# Patient Record
Sex: FEMALE | Race: BLACK OR AFRICAN AMERICAN | NOT HISPANIC OR LATINO | Employment: UNEMPLOYED | URBAN - METROPOLITAN AREA
[De-identification: names, ages, dates, MRNs, and addresses within clinical notes are randomized per-mention and may not be internally consistent; named-entity substitution may affect disease eponyms.]

---

## 2017-07-07 ENCOUNTER — HOSPITAL ENCOUNTER (INPATIENT)
Facility: HOSPITAL | Age: 52
LOS: 7 days | Discharge: HOME | DRG: 337 | End: 2017-07-14
Attending: EMERGENCY MEDICINE | Admitting: SURGERY
Payer: MEDICAID

## 2017-07-07 DIAGNOSIS — R10.9 ABDOMINAL PAIN, UNSPECIFIED LOCATION: ICD-10-CM

## 2017-07-07 DIAGNOSIS — K56.60 INTESTINAL OBSTRUCTION, UNSPECIFIED TYPE: Primary | ICD-10-CM

## 2017-07-07 DIAGNOSIS — R06.02 SHORTNESS OF BREATH: ICD-10-CM

## 2017-07-07 DIAGNOSIS — R10.9 ABDOMINAL PAIN: ICD-10-CM

## 2017-07-07 DIAGNOSIS — K56.609 SMALL BOWEL OBSTRUCTION: ICD-10-CM

## 2017-07-07 LAB
ALBUMIN SERPL BCP-MCNC: 3.7 G/DL
ALP SERPL-CCNC: 100 U/L
ALT SERPL W/O P-5'-P-CCNC: 8 U/L
ANION GAP SERPL CALC-SCNC: 11 MMOL/L
AST SERPL-CCNC: 15 U/L
BASOPHILS # BLD AUTO: 0.01 K/UL
BASOPHILS NFR BLD: 0.1 %
BILIRUB SERPL-MCNC: 0.7 MG/DL
BILIRUB UR QL STRIP: NEGATIVE
BUN SERPL-MCNC: 15 MG/DL
CALCIUM SERPL-MCNC: 9.9 MG/DL
CHLORIDE SERPL-SCNC: 103 MMOL/L
CLARITY UR REFRACT.AUTO: ABNORMAL
CO2 SERPL-SCNC: 27 MMOL/L
COLOR UR AUTO: YELLOW
CREAT SERPL-MCNC: 0.9 MG/DL (ref 0.5–1.4)
CREAT SERPL-MCNC: 1.1 MG/DL
DIFFERENTIAL METHOD: ABNORMAL
EOSINOPHIL # BLD AUTO: 0.1 K/UL
EOSINOPHIL NFR BLD: 1 %
ERYTHROCYTE [DISTWIDTH] IN BLOOD BY AUTOMATED COUNT: 13.1 %
EST. GFR  (AFRICAN AMERICAN): >60 ML/MIN/1.73 M^2
EST. GFR  (NON AFRICAN AMERICAN): 57.1 ML/MIN/1.73 M^2
ESTIMATED AVG GLUCOSE: 100 MG/DL
GLUCOSE SERPL-MCNC: 135 MG/DL
GLUCOSE UR QL STRIP: NEGATIVE
HBA1C MFR BLD HPLC: 5.1 %
HCT VFR BLD AUTO: 45.2 %
HGB BLD-MCNC: 16.3 G/DL
HGB UR QL STRIP: NEGATIVE
KETONES UR QL STRIP: NEGATIVE
LEUKOCYTE ESTERASE UR QL STRIP: NEGATIVE
LIPASE SERPL-CCNC: 23 U/L
LYMPHOCYTES # BLD AUTO: 1.7 K/UL
LYMPHOCYTES NFR BLD: 18.4 %
MCH RBC QN AUTO: 31.2 PG
MCHC RBC AUTO-ENTMCNC: 36.1 %
MCV RBC AUTO: 87 FL
MICROSCOPIC COMMENT: NORMAL
MONOCYTES # BLD AUTO: 0.4 K/UL
MONOCYTES NFR BLD: 4.1 %
NEUTROPHILS # BLD AUTO: 7.2 K/UL
NEUTROPHILS NFR BLD: 76.2 %
NITRITE UR QL STRIP: NEGATIVE
PH UR STRIP: >8 [PH] (ref 5–8)
PLATELET # BLD AUTO: 324 K/UL
PMV BLD AUTO: 9.4 FL
POTASSIUM SERPL-SCNC: 4.2 MMOL/L
PROT SERPL-MCNC: 7.7 G/DL
PROT UR QL STRIP: NEGATIVE
RBC # BLD AUTO: 5.22 M/UL
SAMPLE: NORMAL
SODIUM SERPL-SCNC: 141 MMOL/L
SP GR UR STRIP: >=1.03 (ref 1–1.03)
SQUAMOUS #/AREA URNS AUTO: 4 /HPF
URN SPEC COLLECT METH UR: ABNORMAL
UROBILINOGEN UR STRIP-ACNC: NEGATIVE EU/DL
WBC # BLD AUTO: 9.45 K/UL

## 2017-07-07 PROCEDURE — 96376 TX/PRO/DX INJ SAME DRUG ADON: CPT

## 2017-07-07 PROCEDURE — 20600001 HC STEP DOWN PRIVATE ROOM

## 2017-07-07 PROCEDURE — 85025 COMPLETE CBC W/AUTO DIFF WBC: CPT

## 2017-07-07 PROCEDURE — 63600175 PHARM REV CODE 636 W HCPCS: Performed by: STUDENT IN AN ORGANIZED HEALTH CARE EDUCATION/TRAINING PROGRAM

## 2017-07-07 PROCEDURE — 82803 BLOOD GASES ANY COMBINATION: CPT

## 2017-07-07 PROCEDURE — 25500020 PHARM REV CODE 255: Performed by: EMERGENCY MEDICINE

## 2017-07-07 PROCEDURE — 96375 TX/PRO/DX INJ NEW DRUG ADDON: CPT

## 2017-07-07 PROCEDURE — 81003 URINALYSIS AUTO W/O SCOPE: CPT

## 2017-07-07 PROCEDURE — 25000003 PHARM REV CODE 250: Performed by: SURGERY

## 2017-07-07 PROCEDURE — 99900035 HC TECH TIME PER 15 MIN (STAT)

## 2017-07-07 PROCEDURE — 63600175 PHARM REV CODE 636 W HCPCS: Performed by: SURGERY

## 2017-07-07 PROCEDURE — 63600175 PHARM REV CODE 636 W HCPCS: Performed by: GENERAL PRACTICE

## 2017-07-07 PROCEDURE — 96374 THER/PROPH/DIAG INJ IV PUSH: CPT

## 2017-07-07 PROCEDURE — 83690 ASSAY OF LIPASE: CPT

## 2017-07-07 PROCEDURE — 99285 EMERGENCY DEPT VISIT HI MDM: CPT | Mod: ,,, | Performed by: PHYSICIAN ASSISTANT

## 2017-07-07 PROCEDURE — 25000003 PHARM REV CODE 250: Performed by: GENERAL PRACTICE

## 2017-07-07 PROCEDURE — 25000003 PHARM REV CODE 250: Performed by: PHYSICIAN ASSISTANT

## 2017-07-07 PROCEDURE — 25000003 PHARM REV CODE 250: Performed by: STUDENT IN AN ORGANIZED HEALTH CARE EDUCATION/TRAINING PROGRAM

## 2017-07-07 PROCEDURE — 96361 HYDRATE IV INFUSION ADD-ON: CPT

## 2017-07-07 PROCEDURE — 99223 1ST HOSP IP/OBS HIGH 75: CPT | Mod: ,,, | Performed by: SURGERY

## 2017-07-07 PROCEDURE — 63600175 PHARM REV CODE 636 W HCPCS: Performed by: PHYSICIAN ASSISTANT

## 2017-07-07 PROCEDURE — 80053 COMPREHEN METABOLIC PANEL: CPT

## 2017-07-07 PROCEDURE — 83036 HEMOGLOBIN GLYCOSYLATED A1C: CPT

## 2017-07-07 PROCEDURE — 82565 ASSAY OF CREATININE: CPT

## 2017-07-07 PROCEDURE — 96372 THER/PROPH/DIAG INJ SC/IM: CPT

## 2017-07-07 PROCEDURE — 81001 URINALYSIS AUTO W/SCOPE: CPT

## 2017-07-07 PROCEDURE — 99285 EMERGENCY DEPT VISIT HI MDM: CPT | Mod: 25

## 2017-07-07 RX ORDER — SODIUM CHLORIDE 0.9 % (FLUSH) 0.9 %
3 SYRINGE (ML) INJECTION EVERY 8 HOURS
Status: DISCONTINUED | OUTPATIENT
Start: 2017-07-07 | End: 2017-07-14 | Stop reason: HOSPADM

## 2017-07-07 RX ORDER — HYDRALAZINE HYDROCHLORIDE 20 MG/ML
10 INJECTION INTRAMUSCULAR; INTRAVENOUS EVERY 6 HOURS PRN
Status: DISCONTINUED | OUTPATIENT
Start: 2017-07-07 | End: 2017-07-07

## 2017-07-07 RX ORDER — LABETALOL HYDROCHLORIDE 5 MG/ML
10 INJECTION, SOLUTION INTRAVENOUS
Status: COMPLETED | OUTPATIENT
Start: 2017-07-07 | End: 2017-07-07

## 2017-07-07 RX ORDER — AMLODIPINE BESYLATE 10 MG/1
10 TABLET ORAL DAILY
Status: DISCONTINUED | OUTPATIENT
Start: 2017-07-07 | End: 2017-07-11

## 2017-07-07 RX ORDER — ONDANSETRON 2 MG/ML
4 INJECTION INTRAMUSCULAR; INTRAVENOUS
Status: COMPLETED | OUTPATIENT
Start: 2017-07-07 | End: 2017-07-07

## 2017-07-07 RX ORDER — OXYCODONE AND ACETAMINOPHEN 5; 325 MG/1; MG/1
2 TABLET ORAL
Status: COMPLETED | OUTPATIENT
Start: 2017-07-07 | End: 2017-07-07

## 2017-07-07 RX ORDER — HYDRALAZINE HYDROCHLORIDE 20 MG/ML
10 INJECTION INTRAMUSCULAR; INTRAVENOUS ONCE
Status: COMPLETED | OUTPATIENT
Start: 2017-07-07 | End: 2017-07-07

## 2017-07-07 RX ORDER — HYDRALAZINE HYDROCHLORIDE 20 MG/ML
20 INJECTION INTRAMUSCULAR; INTRAVENOUS EVERY 6 HOURS PRN
Status: DISCONTINUED | OUTPATIENT
Start: 2017-07-07 | End: 2017-07-14 | Stop reason: HOSPADM

## 2017-07-07 RX ORDER — AMLODIPINE BESYLATE 10 MG/1
10 TABLET ORAL DAILY
COMMUNITY
End: 2018-09-11

## 2017-07-07 RX ORDER — IBUPROFEN 200 MG
1 TABLET ORAL DAILY
Status: DISCONTINUED | OUTPATIENT
Start: 2017-07-07 | End: 2017-07-14 | Stop reason: HOSPADM

## 2017-07-07 RX ORDER — LISINOPRIL 10 MG/1
10 TABLET ORAL DAILY
Status: DISCONTINUED | OUTPATIENT
Start: 2017-07-07 | End: 2017-07-07

## 2017-07-07 RX ORDER — AMLODIPINE BESYLATE 10 MG/1
10 TABLET ORAL DAILY
Status: DISCONTINUED | OUTPATIENT
Start: 2017-07-07 | End: 2017-07-07

## 2017-07-07 RX ORDER — HYDRALAZINE HYDROCHLORIDE 20 MG/ML
10 INJECTION INTRAMUSCULAR; INTRAVENOUS ONCE
Status: DISCONTINUED | OUTPATIENT
Start: 2017-07-07 | End: 2017-07-08

## 2017-07-07 RX ORDER — ONDANSETRON 2 MG/ML
8 INJECTION INTRAMUSCULAR; INTRAVENOUS EVERY 8 HOURS PRN
Status: DISCONTINUED | OUTPATIENT
Start: 2017-07-07 | End: 2017-07-14 | Stop reason: HOSPADM

## 2017-07-07 RX ORDER — LISINOPRIL 5 MG/1
10 TABLET ORAL DAILY
Status: DISCONTINUED | OUTPATIENT
Start: 2017-07-07 | End: 2017-07-14 | Stop reason: HOSPADM

## 2017-07-07 RX ORDER — SODIUM CHLORIDE 9 MG/ML
INJECTION, SOLUTION INTRAVENOUS CONTINUOUS
Status: DISCONTINUED | OUTPATIENT
Start: 2017-07-07 | End: 2017-07-09

## 2017-07-07 RX ORDER — MORPHINE SULFATE 2 MG/ML
4 INJECTION, SOLUTION INTRAMUSCULAR; INTRAVENOUS
Status: DISCONTINUED | OUTPATIENT
Start: 2017-07-07 | End: 2017-07-07

## 2017-07-07 RX ORDER — MORPHINE SULFATE 2 MG/ML
2 INJECTION, SOLUTION INTRAMUSCULAR; INTRAVENOUS
Status: DISCONTINUED | OUTPATIENT
Start: 2017-07-07 | End: 2017-07-14 | Stop reason: HOSPADM

## 2017-07-07 RX ORDER — DICYCLOMINE HYDROCHLORIDE 10 MG/ML
20 INJECTION INTRAMUSCULAR
Status: COMPLETED | OUTPATIENT
Start: 2017-07-07 | End: 2017-07-07

## 2017-07-07 RX ORDER — HYDRALAZINE HYDROCHLORIDE 20 MG/ML
10 INJECTION INTRAMUSCULAR; INTRAVENOUS
Status: COMPLETED | OUTPATIENT
Start: 2017-07-07 | End: 2017-07-07

## 2017-07-07 RX ORDER — LABETALOL HYDROCHLORIDE 5 MG/ML
10 INJECTION, SOLUTION INTRAVENOUS ONCE
Status: COMPLETED | OUTPATIENT
Start: 2017-07-07 | End: 2017-07-07

## 2017-07-07 RX ORDER — BENAZEPRIL HYDROCHLORIDE 20 MG/1
20 TABLET ORAL DAILY
Status: DISCONTINUED | OUTPATIENT
Start: 2017-07-07 | End: 2017-07-07

## 2017-07-07 RX ORDER — LABETALOL HYDROCHLORIDE 5 MG/ML
20 INJECTION, SOLUTION INTRAVENOUS EVERY 6 HOURS PRN
Status: DISCONTINUED | OUTPATIENT
Start: 2017-07-07 | End: 2017-07-10

## 2017-07-07 RX ORDER — LABETALOL HYDROCHLORIDE 5 MG/ML
10 INJECTION, SOLUTION INTRAVENOUS ONCE
Status: DISCONTINUED | OUTPATIENT
Start: 2017-07-07 | End: 2017-07-08

## 2017-07-07 RX ADMIN — Medication 3 ML: at 06:07

## 2017-07-07 RX ADMIN — SODIUM CHLORIDE 1000 ML: 0.9 INJECTION, SOLUTION INTRAVENOUS at 04:07

## 2017-07-07 RX ADMIN — HYDRALAZINE HYDROCHLORIDE 10 MG: 20 INJECTION INTRAMUSCULAR; INTRAVENOUS at 04:07

## 2017-07-07 RX ADMIN — OXYCODONE HYDROCHLORIDE AND ACETAMINOPHEN 2 TABLET: 5; 325 TABLET ORAL at 05:07

## 2017-07-07 RX ADMIN — ONDANSETRON 4 MG: 2 INJECTION INTRAMUSCULAR; INTRAVENOUS at 04:07

## 2017-07-07 RX ADMIN — LABETALOL HYDROCHLORIDE 10 MG: 5 INJECTION, SOLUTION INTRAVENOUS at 04:07

## 2017-07-07 RX ADMIN — HYDRALAZINE HYDROCHLORIDE 20 MG: 20 INJECTION INTRAMUSCULAR; INTRAVENOUS at 06:07

## 2017-07-07 RX ADMIN — ONDANSETRON 8 MG: 2 INJECTION INTRAMUSCULAR; INTRAVENOUS at 02:07

## 2017-07-07 RX ADMIN — MORPHINE SULFATE 2 MG: 2 INJECTION, SOLUTION INTRAMUSCULAR; INTRAVENOUS at 02:07

## 2017-07-07 RX ADMIN — HYDRALAZINE HYDROCHLORIDE 10 MG: 20 INJECTION INTRAMUSCULAR; INTRAVENOUS at 09:07

## 2017-07-07 RX ADMIN — NICOTINE 1 PATCH: 14 PATCH, EXTENDED RELEASE TRANSDERMAL at 07:07

## 2017-07-07 RX ADMIN — Medication 3 ML: at 09:07

## 2017-07-07 RX ADMIN — IOHEXOL 75 ML: 350 INJECTION, SOLUTION INTRAVENOUS at 04:07

## 2017-07-07 RX ADMIN — LABETALOL HYDROCHLORIDE 20 MG: 5 INJECTION, SOLUTION INTRAVENOUS at 08:07

## 2017-07-07 RX ADMIN — HYDRALAZINE HYDROCHLORIDE 10 MG: 20 INJECTION INTRAMUSCULAR; INTRAVENOUS at 07:07

## 2017-07-07 RX ADMIN — MORPHINE SULFATE 2 MG: 2 INJECTION, SOLUTION INTRAMUSCULAR; INTRAVENOUS at 06:07

## 2017-07-07 RX ADMIN — HYDRALAZINE HYDROCHLORIDE 10 MG: 20 INJECTION INTRAMUSCULAR; INTRAVENOUS at 05:07

## 2017-07-07 RX ADMIN — AMLODIPINE BESYLATE 10 MG: 10 TABLET ORAL at 09:07

## 2017-07-07 RX ADMIN — SODIUM CHLORIDE: 0.9 INJECTION, SOLUTION INTRAVENOUS at 07:07

## 2017-07-07 RX ADMIN — LISINOPRIL 10 MG: 10 TABLET ORAL at 09:07

## 2017-07-07 RX ADMIN — DICYCLOMINE HYDROCHLORIDE 20 MG: 20 INJECTION, SOLUTION INTRAMUSCULAR at 04:07

## 2017-07-07 NOTE — ED TRIAGE NOTES
Pt brought to ED via  EMS. PT c/o  Mid epigastric pain and nausea and vomiting for the past day. Pt states pain woke her up this evening. Pt describes as burning pain and rates as 10 out of 10.

## 2017-07-07 NOTE — ED PROVIDER NOTES
Encounter Date: 7/7/2017       History     Chief Complaint   Patient presents with    Abdominal Pain     x1 day. +nausea/vomiting x1 hour     52-year-old female presents to the ER for evaluation of abdominal pain.  Patient reports acute onset, 2 hours ago of stabbing abdominal pain.  Abdominal pain is diffuse and nonfocal.  She denies any nausea or vomiting fever or chills.  She denies any chest pain or shortness of breath.  Patient reports last time she had this pain she was diagnosed with uterine cancer.  She denies any changes in her bowel or bladder.          Review of patient's allergies indicates:   Allergen Reactions    Penicillins Hives     Past Medical History:   Diagnosis Date    Cancer     Diabetes mellitus     High cholesterol     Hypertension     Stroke with cerebral ischemia     Stroke, thrombotic      Past Surgical History:   Procedure Laterality Date    ABDOMINAL SURGERY       Family History   Problem Relation Age of Onset    Hypertension Father     Diabetes Maternal Aunt      Social History   Substance Use Topics    Smoking status: Current Every Day Smoker     Packs/day: 0.50     Types: Cigarettes    Smokeless tobacco: Not on file    Alcohol use Yes     Review of Systems   Constitutional: Negative for fever.   HENT: Negative for sore throat.    Respiratory: Negative for shortness of breath.    Cardiovascular: Negative for chest pain.   Gastrointestinal: Positive for abdominal pain. Negative for nausea and vomiting.   Genitourinary: Negative for dysuria.   Musculoskeletal: Negative for back pain.   Skin: Negative for rash.   Neurological: Negative for weakness.   Hematological: Does not bruise/bleed easily.       Physical Exam     Initial Vitals [07/07/17 0348]   BP Pulse Resp Temp SpO2   (!) 155/89 88 16 97.8 °F (36.6 °C) 97 %      MAP       111         Physical Exam    Constitutional: Vital signs are normal. She appears well-developed and well-nourished.   HENT:   Head: Normocephalic  and atraumatic.   Eyes: Conjunctivae are normal.   Cardiovascular: Normal rate and regular rhythm.   Pulmonary/Chest: She has no rhonchi. She exhibits no tenderness.   Abdominal: Soft. Normal appearance, normal aorta and bowel sounds are normal. She exhibits no distension and no mass. There is tenderness. There is no guarding.   Generalized abdominal pain on exam  Good bowel sounds throughout   Musculoskeletal: Normal range of motion.   Neurological: She is alert and oriented to person, place, and time.   Skin: Skin is warm and intact.   Psychiatric: She has a normal mood and affect. Her speech is normal and behavior is normal. Cognition and memory are normal.         ED Course   Procedures  Labs Reviewed   COMPREHENSIVE METABOLIC PANEL - Abnormal; Notable for the following:        Result Value    Glucose 135 (*)     ALT 8 (*)     eGFR if non  57.1 (*)     All other components within normal limits   URINALYSIS, REFLEX TO URINE CULTURE - Abnormal; Notable for the following:     Appearance, UA Cloudy (*)     pH, UA >8.0 (*)     Specific Gravity, UA >=1.030 (*)     All other components within normal limits   CBC W/ AUTO DIFFERENTIAL - Abnormal; Notable for the following:     Hemoglobin 16.3 (*)     MCH 31.2 (*)     MCHC 36.1 (*)     Gran% 76.2 (*)     All other components within normal limits   LIPASE   URINALYSIS MICROSCOPIC   HEMOGLOBIN A1C   ISTAT CREATININE             Medical Decision Making:   ED Management:  52-year-old female with abdominal pain.  Her pain was improved with Bentyl.   Laboratory analysis reveals no acute findings  Disposition pending CT scan  CT scan reveals multiple dilated loops of bowel with a transition point concerning for SBO  Patient's blood pressure has been difficult to manage.  She has chronic hypertension and has been noncompliant on both of her daily medications  Patient's last bowel movement was yesterday.  She has not had any emesis here in the ER.  I have spoken  with general surgery.   Time 0615 - disposition pending general surgery consultation  Patient was admitted to general surgery                   ED Course     Clinical Impression:   The primary encounter diagnosis was Intestinal obstruction, unspecified type. A diagnosis of Abdominal pain, unspecified location was also pertinent to this visit.    Disposition:   Disposition: Admitted  Condition: Stable                        Angel Hensley PA-C  07/07/17 0635

## 2017-07-07 NOTE — PLAN OF CARE
Problem: Patient Care Overview  Goal: Plan of Care Review  Outcome: Ongoing (interventions implemented as appropriate)  Plan of care reviewed with patient. Patient verbalized understanding.  Patient is AAO and VSS. Pain managed with PRN pain meds.  Nausea managed with PRN meds. NPO.  NG tube in right meraz to LIWS. Voids on own with good output. Ambulates independently in room. Free of falls and injury. Will continue to monitor. Brenda Leigh RN

## 2017-07-07 NOTE — PROGRESS NOTES
Paged by nurse for patient's abdominal pain. Went to examine patient. She is not in acute distress. Denies any nausea. Pain is in LLQ and RUQ.    Vitals:    07/07/17 1357   BP: (!) 145/81   Pulse:    Resp:    Temp: 99.7 °F (37.6 °C)     Gen: NAD, resting in bed  CV: RRR  Pulm: CTAB  Abd: soft, TTP in the LLQ and RUQ, no rebound or guarding, no masses, no peritoneal signs  Extremities: no edema; ROM normal    Plan:  Continue conservation mgt; continue with serial abdominal exams  Ok for prn meds    Edmar Miller MD  PGY-3  [phone] (pager)

## 2017-07-07 NOTE — PROGRESS NOTES
[facility]  General Surgery  Progress Note    Subjective:     Interval History: Patient admitted this morning for SBO. Seen and examined. States abdominal pain has improved and overall feels better. No nausea. Still feels distended. Last BM was yesterday.    Post-Op Info:  * No surgery found *          Medications:  Continuous Infusions:   sodium chloride 0.9% 125 mL/hr at 07/07/17 0740     Scheduled Meds:   amlodipine  10 mg Oral Daily    benazepril  20 mg Oral Daily    nicotine  1 patch Transdermal Daily    sodium chloride 0.9%  3 mL Intravenous Q8H     PRN Meds:hydrALAZINE, ondansetron, promethazine (PHENERGAN) IVPB     Objective:     Vital Signs (Most Recent):  Temp: 99.5 °F (37.5 °C) (07/07/17 0712)  Pulse: 61 (07/07/17 0738)  Resp: 18 (07/07/17 0738)  BP: (!) 212/95 (07/07/17 0738)  SpO2: 100 % (07/07/17 0738) Vital Signs (24h Range):  Temp:  [97.8 °F (36.6 °C)-99.5 °F (37.5 °C)] 99.5 °F (37.5 °C)  Pulse:  [61-88] 61  Resp:  [16-18] 18  SpO2:  [97 %-100 %] 100 %  BP: (155-229)/() 212/95     No intake or output data in the 24 hours ending 07/07/17 0755    Physical Exam   Constitutional: She is oriented to person, place, and time. She appears well-developed and well-nourished.   HENT:   Head: Normocephalic and atraumatic.   Eyes: EOM are normal.   Neck: Normal range of motion. Neck supple.   Cardiovascular: Normal rate, regular rhythm and normal heart sounds.    Hypertensive with systolics in the 190s   Pulmonary/Chest: Effort normal and breath sounds normal.   Abdominal: Soft. Bowel sounds are normal. She exhibits distension. There is no tenderness.   Neurological: She is alert and oriented to person, place, and time.   Skin: Skin is warm and dry.   Psychiatric: She has a normal mood and affect. Her behavior is normal.       Significant Labs:  BMP:   Recent Labs  Lab 07/07/17  0407   *      K 4.2      CO2 27   BUN 15   CREATININE 1.1   CALCIUM 9.9     CBC:    Recent Labs  Lab 07/07/17  0441   WBC 9.45   RBC 5.22   HGB 16.3*   HCT 45.2      MCV 87   MCH 31.2*   MCHC 36.1*       Significant Diagnostics:  CT: I have reviewed all pertinent results/findings within the past 24 hours.    1.  Multiple dilated loops of small bowel within the left abdomen with transition point in the mid abdomen concerning for small bowel obstruction.    2.  Status post hysterectomy.  Left Bartholin's gland cyst.    3.  Findings suggestive of hepatic steatosis with focal region of fatty infiltration along the ligamentum teres.    4.  Additional findings as above.    Assessment/Plan:     Active Diagnoses:    Diagnosis Date Noted POA    PRINCIPAL PROBLEM:  Small bowel obstruction [K56.69] 07/07/2017 Yes    Intestinal obstruction [K56.60] 07/07/2017 Yes      Problems Resolved During this Admission:    Diagnosis Date Noted Date Resolved POA     -Continue with conservative management; keep patient NPO with IVF  -GI and DVT prophylaxis  -Nicotine patch for history of smoking (1p q2 days)  -PRN IV pain meds and anti-emetics  -PRN IV anti-hypertensives  -Discussed with Dr. Goldberg Jeffanne Millien, MD  General Surgery  [facility]

## 2017-07-07 NOTE — H&P
[facility]  General Surgery  Consult Note    Inpatient consult to General surgery  Consult performed by: SCHOEN, JONATHAN  Consult ordered by: KAYLEEN SALCEDO  Reason for consult: SBO  Assessment/Recommendations: Admit to general surgery  NPO c IVF  NG insertion completed in ED, pending KUB to confirm  Discussed with Dr. Goldberg        Subjective:     Chief Complaint/Reason for Admission: Small Bowel Obstruction    History of Present Illness: The patient Brisa Chavez is a 52 y.o. female with history of HTN, HLD, endometriosis with ROSEY last year at an outside facility presents with approx 18 hour history of abdominal pain and non-bilious, non-bloody nausea and vomiting.  She reports the abdominal pain is a diffuse ache in her midabdomen that is non-radiating.  She states her last bowel movement was yesterday and she also has not passed flatus since that time.  She denies prior similar episodes.      No current facility-administered medications on file prior to encounter.      Current Outpatient Prescriptions on File Prior to Encounter   Medication Sig    aspirin (ECOTRIN) 325 MG EC tablet Take 1 tablet (325 mg total) by mouth once daily.    atorvastatin (LIPITOR) 10 MG tablet Take 10 mg by mouth once daily.    benazepril-hydrochlorthiazide (LOTENSIN HCT) 20-25 mg Tab Take 1 tablet by mouth once daily.    lisinopril 10 MG tablet Take 10 mg by mouth once daily.    METFORMIN HCL (METFORMIN ORAL) Take by mouth.    oxycodone-acetaminophen (PERCOCET) 5-325 mg per tablet Take 1 tablet by mouth every 6 (six) hours as needed for Pain. No alcohol, no driving, no operating machinery, no working, no swimming, no additional Tylenol (acetaminophen) while taking this medication.       Review of patient's allergies indicates:   Allergen Reactions    Penicillins Hives       Past Medical History:   Diagnosis Date    Cancer     Diabetes mellitus     High cholesterol     Hypertension     Stroke with  cerebral ischemia     Stroke, thrombotic      Past Surgical History:   Procedure Laterality Date    ABDOMINAL SURGERY       Family History     Problem Relation (Age of Onset)    Diabetes Maternal Aunt    Hypertension Father        Social History Main Topics    Smoking status: Current Every Day Smoker     Packs/day: 0.50     Types: Cigarettes    Smokeless tobacco: Not on file    Alcohol use Yes    Drug use: No    Sexual activity: Not on file     Review of Systems   Constitutional: Negative for chills and fever.   HENT: Negative for congestion and rhinorrhea.    Eyes: Negative for visual disturbance.   Respiratory: Negative for apnea, chest tightness and shortness of breath.    Cardiovascular: Negative for chest pain and palpitations.   Gastrointestinal: Positive for abdominal distention, abdominal pain, nausea and vomiting. Negative for constipation and diarrhea.   Endocrine: Negative for cold intolerance and heat intolerance.   Musculoskeletal: Negative for arthralgias and myalgias.   Skin: Negative for color change, pallor, rash and wound.   Hematological: Negative for adenopathy. Does not bruise/bleed easily.     Objective:     Vital Signs (Most Recent):  Temp: 97.8 °F (36.6 °C) (07/07/17 0348)  Pulse: 88 (07/07/17 0348)  Resp: 16 (07/07/17 0348)  BP: (!) 229/103 (07/07/17 0521)  SpO2: 100 % (07/07/17 0521) Vital Signs (24h Range):  Temp:  [97.8 °F (36.6 °C)] 97.8 °F (36.6 °C)  Pulse:  [88] 88  Resp:  [16] 16  SpO2:  [97 %-100 %] 100 %  BP: (155-229)/() 229/103     Weight: 72.1 kg (159 lb)  Body mass index is 26.46 kg/m².    No intake or output data in the 24 hours ending 07/07/17 0615    Physical Exam   Constitutional: She is oriented to person, place, and time. She appears well-developed and well-nourished.   Cardiovascular: Normal rate.    Pulmonary/Chest: Effort normal. No respiratory distress.   Abdominal: Soft. She exhibits distension. Bowel sounds are increased. There is no tenderness. No  hernia.       Neurological: She is alert and oriented to person, place, and time.   Nursing note and vitals reviewed.      Significant Labs:  Recent Labs      07/07/17   0441   WBC  9.45   HGB  16.3*   HCT  45.2   PLT  324     Recent Labs      07/07/17   0407   NA  141   K  4.2   CL  103   CO2  27   BUN  15   CREATININE  1.1   PROT  7.7   ALBUMIN  3.7   BILITOT  0.7   AST  15   ALKPHOS  100   ALT  8*           Significant Diagnostics:  Impression        1.  Multiple dilated loops of small bowel within the left abdomen with transition point in the mid abdomen concerning for small bowel obstruction.    2.  Status post hysterectomy.  Left Bartholin's gland cyst.    3.  Findings suggestive of hepatic steatosis with focal region of fatty infiltration along the ligamentum teres.    4.  Additional findings as above.    ______________________________________     Electronically signed by resident: OSMANY BRASWELL MD  Date: 07/07/17  Time: 05:37         Assessment/Plan:     There are no hospital problems to display for this patient.      Thank you for your consult.     Jonathan Schoen, MD  General Surgery  [facility]

## 2017-07-07 NOTE — ED NOTES
The patient is resting quietly, eyes closed, arouses easily to stimuli. Airway is open and patent, respirations are spontaneous, normal respiratory effort and rate noted, skin warm and dry, appearance: in no acute distress and resting comfortably. NG tube present in left nostril connected to low intermittent suction. Patient reminded that she is NPO at this time. Patient repositioned for comfort. Pulse ox and blood pressure cuff remain on with alarms set. Lights dimmed per patient request. Call bell within reach. Side rails x 2. Telephone placed within reach of patient, instructed on use.  Will continue to monitor at this time.

## 2017-07-07 NOTE — ED NOTES
General surgery paged to change PO meds to NG tube and to notify about blood pressure 207/88 after hydralazine, stated that they would put something else in shortly, verbalized understanding.

## 2017-07-08 PROBLEM — I10 HYPERTENSION: Status: ACTIVE | Noted: 2017-07-08

## 2017-07-08 PROBLEM — E87.6 HYPOKALEMIA: Status: ACTIVE | Noted: 2017-07-08

## 2017-07-08 LAB
ANION GAP SERPL CALC-SCNC: 10 MMOL/L
BASOPHILS # BLD AUTO: 0.01 K/UL
BASOPHILS NFR BLD: 0.1 %
BUN SERPL-MCNC: 15 MG/DL
CALCIUM SERPL-MCNC: 9 MG/DL
CHLORIDE SERPL-SCNC: 105 MMOL/L
CO2 SERPL-SCNC: 25 MMOL/L
CREAT SERPL-MCNC: 0.9 MG/DL
DIFFERENTIAL METHOD: ABNORMAL
EOSINOPHIL # BLD AUTO: 0 K/UL
EOSINOPHIL NFR BLD: 0.1 %
ERYTHROCYTE [DISTWIDTH] IN BLOOD BY AUTOMATED COUNT: 13.5 %
EST. GFR  (AFRICAN AMERICAN): >60 ML/MIN/1.73 M^2
EST. GFR  (NON AFRICAN AMERICAN): >60 ML/MIN/1.73 M^2
GLUCOSE SERPL-MCNC: 136 MG/DL
HCT VFR BLD AUTO: 40.3 %
HGB BLD-MCNC: 14.1 G/DL
LYMPHOCYTES # BLD AUTO: 1.5 K/UL
LYMPHOCYTES NFR BLD: 17.9 %
MAGNESIUM SERPL-MCNC: 1.9 MG/DL
MCH RBC QN AUTO: 30.7 PG
MCHC RBC AUTO-ENTMCNC: 35 %
MCV RBC AUTO: 88 FL
MONOCYTES # BLD AUTO: 0.4 K/UL
MONOCYTES NFR BLD: 4.3 %
NEUTROPHILS # BLD AUTO: 6.5 K/UL
NEUTROPHILS NFR BLD: 77.4 %
PHOSPHATE SERPL-MCNC: 2.9 MG/DL
PLATELET # BLD AUTO: 275 K/UL
PMV BLD AUTO: 10.2 FL
POTASSIUM SERPL-SCNC: 3.3 MMOL/L
RBC # BLD AUTO: 4.59 M/UL
SODIUM SERPL-SCNC: 140 MMOL/L
WBC # BLD AUTO: 8.38 K/UL

## 2017-07-08 PROCEDURE — 63600175 PHARM REV CODE 636 W HCPCS: Performed by: SURGERY

## 2017-07-08 PROCEDURE — 84100 ASSAY OF PHOSPHORUS: CPT

## 2017-07-08 PROCEDURE — 25000003 PHARM REV CODE 250: Performed by: STUDENT IN AN ORGANIZED HEALTH CARE EDUCATION/TRAINING PROGRAM

## 2017-07-08 PROCEDURE — 25000003 PHARM REV CODE 250: Performed by: GENERAL PRACTICE

## 2017-07-08 PROCEDURE — 36415 COLL VENOUS BLD VENIPUNCTURE: CPT

## 2017-07-08 PROCEDURE — 85025 COMPLETE CBC W/AUTO DIFF WBC: CPT

## 2017-07-08 PROCEDURE — 80048 BASIC METABOLIC PNL TOTAL CA: CPT

## 2017-07-08 PROCEDURE — 63600175 PHARM REV CODE 636 W HCPCS: Performed by: STUDENT IN AN ORGANIZED HEALTH CARE EDUCATION/TRAINING PROGRAM

## 2017-07-08 PROCEDURE — 20600001 HC STEP DOWN PRIVATE ROOM

## 2017-07-08 PROCEDURE — 25000003 PHARM REV CODE 250: Performed by: SURGERY

## 2017-07-08 PROCEDURE — 83735 ASSAY OF MAGNESIUM: CPT

## 2017-07-08 RX ORDER — CLONIDINE 0.1 MG/24H
1 PATCH, EXTENDED RELEASE TRANSDERMAL
Status: DISCONTINUED | OUTPATIENT
Start: 2017-07-08 | End: 2017-07-14 | Stop reason: HOSPADM

## 2017-07-08 RX ORDER — FAMOTIDINE 10 MG/ML
20 INJECTION INTRAVENOUS 2 TIMES DAILY
Status: DISCONTINUED | OUTPATIENT
Start: 2017-07-08 | End: 2017-07-14

## 2017-07-08 RX ORDER — POTASSIUM CHLORIDE 7.45 MG/ML
10 INJECTION INTRAVENOUS
Status: COMPLETED | OUTPATIENT
Start: 2017-07-08 | End: 2017-07-08

## 2017-07-08 RX ORDER — ENOXAPARIN SODIUM 100 MG/ML
40 INJECTION SUBCUTANEOUS EVERY 24 HOURS
Status: DISCONTINUED | OUTPATIENT
Start: 2017-07-08 | End: 2017-07-10 | Stop reason: SDUPTHER

## 2017-07-08 RX ADMIN — FAMOTIDINE 20 MG: 10 INJECTION, SOLUTION INTRAVENOUS at 08:07

## 2017-07-08 RX ADMIN — MORPHINE SULFATE 2 MG: 2 INJECTION, SOLUTION INTRAMUSCULAR; INTRAVENOUS at 04:07

## 2017-07-08 RX ADMIN — POTASSIUM CHLORIDE 10 MEQ: 10 INJECTION, SOLUTION INTRAVENOUS at 03:07

## 2017-07-08 RX ADMIN — POTASSIUM CHLORIDE 10 MEQ: 10 INJECTION, SOLUTION INTRAVENOUS at 02:07

## 2017-07-08 RX ADMIN — ONDANSETRON 8 MG: 2 INJECTION INTRAMUSCULAR; INTRAVENOUS at 04:07

## 2017-07-08 RX ADMIN — LABETALOL HYDROCHLORIDE 20 MG: 5 INJECTION, SOLUTION INTRAVENOUS at 04:07

## 2017-07-08 RX ADMIN — LABETALOL HYDROCHLORIDE 20 MG: 5 INJECTION, SOLUTION INTRAVENOUS at 03:07

## 2017-07-08 RX ADMIN — SODIUM CHLORIDE: 0.9 INJECTION, SOLUTION INTRAVENOUS at 04:07

## 2017-07-08 RX ADMIN — POTASSIUM CHLORIDE 10 MEQ: 10 INJECTION, SOLUTION INTRAVENOUS at 12:07

## 2017-07-08 RX ADMIN — POTASSIUM CHLORIDE 10 MEQ: 10 INJECTION, SOLUTION INTRAVENOUS at 11:07

## 2017-07-08 RX ADMIN — Medication 3 ML: at 10:07

## 2017-07-08 RX ADMIN — MORPHINE SULFATE 2 MG: 2 INJECTION, SOLUTION INTRAMUSCULAR; INTRAVENOUS at 08:07

## 2017-07-08 RX ADMIN — POTASSIUM CHLORIDE 10 MEQ: 10 INJECTION, SOLUTION INTRAVENOUS at 01:07

## 2017-07-08 RX ADMIN — NICOTINE 1 PATCH: 14 PATCH, EXTENDED RELEASE TRANSDERMAL at 08:07

## 2017-07-08 RX ADMIN — HYDRALAZINE HYDROCHLORIDE 20 MG: 20 INJECTION INTRAMUSCULAR; INTRAVENOUS at 12:07

## 2017-07-08 RX ADMIN — AMLODIPINE BESYLATE 10 MG: 10 TABLET ORAL at 08:07

## 2017-07-08 RX ADMIN — POTASSIUM CHLORIDE 10 MEQ: 10 INJECTION, SOLUTION INTRAVENOUS at 10:07

## 2017-07-08 RX ADMIN — HYDRALAZINE HYDROCHLORIDE 20 MG: 20 INJECTION INTRAMUSCULAR; INTRAVENOUS at 11:07

## 2017-07-08 RX ADMIN — MORPHINE SULFATE 2 MG: 2 INJECTION, SOLUTION INTRAMUSCULAR; INTRAVENOUS at 12:07

## 2017-07-08 RX ADMIN — LISINOPRIL 10 MG: 10 TABLET ORAL at 08:07

## 2017-07-08 RX ADMIN — MORPHINE SULFATE 2 MG: 2 INJECTION, SOLUTION INTRAMUSCULAR; INTRAVENOUS at 06:07

## 2017-07-08 RX ADMIN — ENOXAPARIN SODIUM 40 MG: 100 INJECTION SUBCUTANEOUS at 08:07

## 2017-07-08 RX ADMIN — CLONIDINE 1 PATCH: 0.1 PATCH TRANSDERMAL at 02:07

## 2017-07-08 NOTE — PROGRESS NOTES
[facility]  General Surgery  Progress Note    Subjective:     History of Present Illness:  No notes on file    Post-Op Info:  * No surgery found *         Interval History: No acute events overnight. HTN that required multiple doses PRN hydralazine and labetalol, on home BP meds. Some abdominal pain, no nausea. NG tube in place. Did pass flatus last night.    Medications:  Continuous Infusions:   sodium chloride 0.9% 125 mL/hr at 07/08/17 0442     Scheduled Meds:   amlodipine  10 mg Per NG tube Daily    hydrALAZINE  10 mg Intravenous Once    labetalol  10 mg Intravenous Once    lisinopril  10 mg Per NG tube Daily    nicotine  1 patch Transdermal Daily    sodium chloride 0.9%  3 mL Intravenous Q8H     PRN Meds:hydrALAZINE, labetalol, morphine, ondansetron, promethazine (PHENERGAN) IVPB     Review of patient's allergies indicates:   Allergen Reactions    Penicillins Hives     Objective:     Vital Signs (Most Recent):  Temp: 99.6 °F (37.6 °C) (07/08/17 0710)  Pulse: 69 (07/08/17 0710)  Resp: 16 (07/08/17 0710)  BP: (!) 156/68 (07/08/17 0710)  SpO2: 96 % (07/08/17 0710) Vital Signs (24h Range):  Temp:  [99.4 °F (37.4 °C)-100.4 °F (38 °C)] 99.6 °F (37.6 °C)  Pulse:  [65-87] 69  Resp:  [16-20] 16  SpO2:  [94 %-100 %] 96 %  BP: (135-212)/(60-95) 156/68     Weight: 72.1 kg (159 lb)  Body mass index is 26.46 kg/m².    Intake/Output - Last 3 Shifts       07/06 0700 - 07/07 0659 07/07 0700 - 07/08 0659 07/08 0700 - 07/09 0659    P.O.  0     I.V. (mL/kg)  2502.1 (34.7)     Total Intake(mL/kg)  2502.1 (34.7)     Urine (mL/kg/hr)  700 (0.4)     Drains  550 (0.3)     Total Output   1250      Net   +1252.1             Urine Occurrence  1 x           Physical Exam   Constitutional: She is oriented to person, place, and time. She appears well-developed and well-nourished.   Eyes: EOM are normal. Pupils are equal, round, and reactive to light.   Neck: Normal range of motion.   Cardiovascular: Normal rate  and regular rhythm.    Pulmonary/Chest: Effort normal.   Abdominal: Soft. Bowel sounds are normal. She exhibits no distension. There is tenderness. There is no rebound and no guarding.   NG tube in place draining brown fluid,300 cc overnight   Musculoskeletal: Normal range of motion.   Neurological: She is alert and oriented to person, place, and time.   Skin: Skin is warm.   Psychiatric: She has a normal mood and affect.       Significant Labs:  CBC:   Recent Labs  Lab 07/08/17  0431   WBC 8.38   RBC 4.59   HGB 14.1   HCT 40.3      MCV 88   MCH 30.7   MCHC 35.0     CMP:   Recent Labs  Lab 07/07/17  0407 07/08/17  0431   * 136*   CALCIUM 9.9 9.0   ALBUMIN 3.7  --    PROT 7.7  --     140   K 4.2 3.3*   CO2 27 25    105   BUN 15 15   CREATININE 1.1 0.9   ALKPHOS 100  --    ALT 8*  --    AST 15  --    BILITOT 0.7  --        Significant Diagnostics:  none    Assessment/Plan:     Hypokalemia    replace        Hypertension    On home BP meds  PRN labetalol and hydralazine        * Small bowel obstruction    53 yo woman with SBO.    - NPO, IVF  - NG tube  - serial abdominal exams  - if she doesn't open up by tomorrow may take to OR monday            Violetta Miles MD  General Surgery  [facility]

## 2017-07-08 NOTE — PROGRESS NOTES
1600  Pt seen for serial abdominal exam. States she is feeling okay, with mild pain, and improved nausea. Denies worsening symptoms, CP/SOB, Vomiting.    Abd exam: soft, non-tender, mildly distended, no guarding   NGT w/ approx 110cc bilious fluid    0030  Interval: See above.   No change in PE    0245  Interval: Pt seen for report of increasing abdominal pain. Pt sleeping comfortably but arousable. States her pain medication is doing less to manage her intermittent abdominal pain. Still c/o mild nausea. Denies onset of additional symptoms    Vitals:    07/09/17 0100   BP: (!) 147/67   Pulse: 72   Resp:    Temp:      NGT output decreased since 2300: approx 100cc    ABD: soft, mildly distended, no guarding, tender to deep palpation, non peritoneal       - Will cont to monitor     Jose Maria Blackwood MD   [phone]  General Surgery [facility]

## 2017-07-08 NOTE — SUBJECTIVE & OBJECTIVE
Interval History: No acute events overnight. HTN that required multiple doses PRN hydralazine and labetalol, on home BP meds. Some abdominal pain, no nausea. NG tube in place. Did pass flatus last night.    Medications:  Continuous Infusions:   sodium chloride 0.9% 125 mL/hr at 07/08/17 0442     Scheduled Meds:   amlodipine  10 mg Per NG tube Daily    hydrALAZINE  10 mg Intravenous Once    labetalol  10 mg Intravenous Once    lisinopril  10 mg Per NG tube Daily    nicotine  1 patch Transdermal Daily    sodium chloride 0.9%  3 mL Intravenous Q8H     PRN Meds:hydrALAZINE, labetalol, morphine, ondansetron, promethazine (PHENERGAN) IVPB     Review of patient's allergies indicates:   Allergen Reactions    Penicillins Hives     Objective:     Vital Signs (Most Recent):  Temp: 99.6 °F (37.6 °C) (07/08/17 0710)  Pulse: 69 (07/08/17 0710)  Resp: 16 (07/08/17 0710)  BP: (!) 156/68 (07/08/17 0710)  SpO2: 96 % (07/08/17 0710) Vital Signs (24h Range):  Temp:  [99.4 °F (37.4 °C)-100.4 °F (38 °C)] 99.6 °F (37.6 °C)  Pulse:  [65-87] 69  Resp:  [16-20] 16  SpO2:  [94 %-100 %] 96 %  BP: (135-212)/(60-95) 156/68     Weight: 72.1 kg (159 lb)  Body mass index is 26.46 kg/m².    Intake/Output - Last 3 Shifts       07/06 0700 - 07/07 0659 07/07 0700 - 07/08 0659 07/08 0700 - 07/09 0659    P.O.  0     I.V. (mL/kg)  2502.1 (34.7)     Total Intake(mL/kg)  2502.1 (34.7)     Urine (mL/kg/hr)  700 (0.4)     Drains  550 (0.3)     Total Output   1250      Net   +1252.1             Urine Occurrence  1 x           Physical Exam   Constitutional: She is oriented to person, place, and time. She appears well-developed and well-nourished.   Eyes: EOM are normal. Pupils are equal, round, and reactive to light.   Neck: Normal range of motion.   Cardiovascular: Normal rate and regular rhythm.    Pulmonary/Chest: Effort normal.   Abdominal: Soft. Bowel sounds are normal. She exhibits no distension. There is tenderness. There is no rebound and no  guarding.   NG tube in place draining brown fluid,300 cc overnight   Musculoskeletal: Normal range of motion.   Neurological: She is alert and oriented to person, place, and time.   Skin: Skin is warm.   Psychiatric: She has a normal mood and affect.       Significant Labs:  CBC:   Recent Labs  Lab 07/08/17  0431   WBC 8.38   RBC 4.59   HGB 14.1   HCT 40.3      MCV 88   MCH 30.7   MCHC 35.0     CMP:   Recent Labs  Lab 07/07/17  0407 07/08/17  0431   * 136*   CALCIUM 9.9 9.0   ALBUMIN 3.7  --    PROT 7.7  --     140   K 4.2 3.3*   CO2 27 25    105   BUN 15 15   CREATININE 1.1 0.9   ALKPHOS 100  --    ALT 8*  --    AST 15  --    BILITOT 0.7  --        Significant Diagnostics:  none

## 2017-07-08 NOTE — PLAN OF CARE
Problem: Patient Care Overview  Goal: Plan of Care Review  Outcome: Ongoing (interventions implemented as appropriate)  POC reviewed with patient. Pt AAOx4, BP hypertensive - managed with PRN meds - all other VSS, afebrile, NSR on tele. SpO2> 92% on rm air. Pt complained of mild pain, no PRN meds needed. Right nares NG tube to LIWS,  Output monitored. Fluids continued. Pt remains NPO, no complaints of nausea/vomiting. Pt urinating per hat. Up with minimal assistance. Pt remains free from falls and injuries, will continue to monitor.

## 2017-07-08 NOTE — PLAN OF CARE
Problem: Patient Care Overview  Goal: Plan of Care Review  Outcome: Ongoing (interventions implemented as appropriate)  Plan of care reviewed with patient. Patient verbalized understanding.  Patient is AAO.  Elevated BP treated with IV hydralazine and Labetalol  Pain managed with PRN pain meds.  No complaint of nausea or vomiting.  NG tube in right meraz to LIWS. Voids on own with good output. Received IV potassium replacement.  Ambulates with stand by assit. Free of falls and injury. Will continue to monitor. Brenda Leigh RN

## 2017-07-08 NOTE — ASSESSMENT & PLAN NOTE
53 yo woman with SBO.    - NPO, IVF  - NG tube  - serial abdominal exams  - if she doesn't open up by tomorrow may take to OR monday

## 2017-07-08 NOTE — PROGRESS NOTES
Progress Note:  Checked in on Brisa voss per request of day ACS for serial abdominal exams. Patient states that her abdominal pain comes and goes and at the time of my visit she said that she had no abdominal pain. She denied nausea and vomiting at the time of my visit. Abdomen is soft, mildly distended, no tenderness to palpation.    Nancy Jiménez   PGY-1 General Surgery  [facility]

## 2017-07-08 NOTE — PLAN OF CARE
Problem: Patient Care Overview  Goal: Plan of Care Review  Outcome: Ongoing (interventions implemented as appropriate)  POC reviewed with patient. Pt AAOx4, BP hypertensive - managed with PRN meds - all other VSS, afebrile, NSR on tele. SpO2> 92% on rm air. Pt complained of mild pain, no PRN meds needed. Right nares NG tube to LIWS,  Output monitored. Fluids continued. Pt remains NPO, complained of nausea x1 during shift. Pt urinating per hat. Up with minimal assistance. Pt remains free from falls and injuries, will continue to monitor.

## 2017-07-09 ENCOUNTER — ANESTHESIA EVENT (OUTPATIENT)
Dept: SURGERY | Facility: HOSPITAL | Age: 52
DRG: 337 | End: 2017-07-09
Payer: MEDICAID

## 2017-07-09 PROBLEM — E83.39 HYPOPHOSPHATASIA: Status: ACTIVE | Noted: 2017-07-09

## 2017-07-09 LAB
ABO + RH BLD: NORMAL
ANION GAP SERPL CALC-SCNC: 8 MMOL/L
BASOPHILS # BLD AUTO: 0.02 K/UL
BASOPHILS NFR BLD: 0.2 %
BLD GP AB SCN CELLS X3 SERPL QL: NORMAL
BUN SERPL-MCNC: 18 MG/DL
CALCIUM SERPL-MCNC: 8.6 MG/DL
CHLORIDE SERPL-SCNC: 109 MMOL/L
CO2 SERPL-SCNC: 25 MMOL/L
CREAT SERPL-MCNC: 0.9 MG/DL
DIFFERENTIAL METHOD: ABNORMAL
EOSINOPHIL # BLD AUTO: 0.1 K/UL
EOSINOPHIL NFR BLD: 1 %
ERYTHROCYTE [DISTWIDTH] IN BLOOD BY AUTOMATED COUNT: 13.6 %
EST. GFR  (AFRICAN AMERICAN): >60 ML/MIN/1.73 M^2
EST. GFR  (NON AFRICAN AMERICAN): >60 ML/MIN/1.73 M^2
GLUCOSE SERPL-MCNC: 116 MG/DL
HCT VFR BLD AUTO: 37.1 %
HGB BLD-MCNC: 13 G/DL
LYMPHOCYTES # BLD AUTO: 2 K/UL
LYMPHOCYTES NFR BLD: 20.6 %
MAGNESIUM SERPL-MCNC: 1.9 MG/DL
MCH RBC QN AUTO: 30.9 PG
MCHC RBC AUTO-ENTMCNC: 35 %
MCV RBC AUTO: 88 FL
MONOCYTES # BLD AUTO: 0.4 K/UL
MONOCYTES NFR BLD: 4.3 %
NEUTROPHILS # BLD AUTO: 7.1 K/UL
NEUTROPHILS NFR BLD: 73.7 %
PHOSPHATE SERPL-MCNC: 2.4 MG/DL
PLATELET # BLD AUTO: 298 K/UL
PMV BLD AUTO: 9.6 FL
POTASSIUM SERPL-SCNC: 3.7 MMOL/L
RBC # BLD AUTO: 4.21 M/UL
SODIUM SERPL-SCNC: 142 MMOL/L
WBC # BLD AUTO: 9.6 K/UL

## 2017-07-09 PROCEDURE — 25000003 PHARM REV CODE 250: Performed by: STUDENT IN AN ORGANIZED HEALTH CARE EDUCATION/TRAINING PROGRAM

## 2017-07-09 PROCEDURE — 36415 COLL VENOUS BLD VENIPUNCTURE: CPT

## 2017-07-09 PROCEDURE — 83735 ASSAY OF MAGNESIUM: CPT

## 2017-07-09 PROCEDURE — 85025 COMPLETE CBC W/AUTO DIFF WBC: CPT

## 2017-07-09 PROCEDURE — 63600175 PHARM REV CODE 636 W HCPCS: Performed by: STUDENT IN AN ORGANIZED HEALTH CARE EDUCATION/TRAINING PROGRAM

## 2017-07-09 PROCEDURE — 20600001 HC STEP DOWN PRIVATE ROOM

## 2017-07-09 PROCEDURE — 25000003 PHARM REV CODE 250: Performed by: SURGERY

## 2017-07-09 PROCEDURE — 84100 ASSAY OF PHOSPHORUS: CPT

## 2017-07-09 PROCEDURE — 25000003 PHARM REV CODE 250: Performed by: GENERAL PRACTICE

## 2017-07-09 PROCEDURE — 86900 BLOOD TYPING SEROLOGIC ABO: CPT

## 2017-07-09 PROCEDURE — 80048 BASIC METABOLIC PNL TOTAL CA: CPT

## 2017-07-09 PROCEDURE — 86901 BLOOD TYPING SEROLOGIC RH(D): CPT

## 2017-07-09 RX ORDER — DEXTROSE MONOHYDRATE, SODIUM CHLORIDE, AND POTASSIUM CHLORIDE 50; 1.49; 4.5 G/1000ML; G/1000ML; G/1000ML
INJECTION, SOLUTION INTRAVENOUS CONTINUOUS
Status: DISCONTINUED | OUTPATIENT
Start: 2017-07-10 | End: 2017-07-14

## 2017-07-09 RX ORDER — POTASSIUM CHLORIDE 7.45 MG/ML
10 INJECTION INTRAVENOUS
Status: COMPLETED | OUTPATIENT
Start: 2017-07-09 | End: 2017-07-09

## 2017-07-09 RX ADMIN — LABETALOL HYDROCHLORIDE 20 MG: 5 INJECTION, SOLUTION INTRAVENOUS at 07:07

## 2017-07-09 RX ADMIN — MORPHINE SULFATE 2 MG: 2 INJECTION, SOLUTION INTRAMUSCULAR; INTRAVENOUS at 06:07

## 2017-07-09 RX ADMIN — Medication 3 ML: at 06:07

## 2017-07-09 RX ADMIN — FAMOTIDINE 20 MG: 10 INJECTION, SOLUTION INTRAVENOUS at 09:07

## 2017-07-09 RX ADMIN — SODIUM PHOSPHATE, MONOBASIC, MONOHYDRATE 30 MMOL: 276; 142 INJECTION, SOLUTION INTRAVENOUS at 09:07

## 2017-07-09 RX ADMIN — MORPHINE SULFATE 2 MG: 2 INJECTION, SOLUTION INTRAMUSCULAR; INTRAVENOUS at 10:07

## 2017-07-09 RX ADMIN — ENOXAPARIN SODIUM 40 MG: 100 INJECTION SUBCUTANEOUS at 05:07

## 2017-07-09 RX ADMIN — MORPHINE SULFATE 2 MG: 2 INJECTION, SOLUTION INTRAMUSCULAR; INTRAVENOUS at 03:07

## 2017-07-09 RX ADMIN — NICOTINE 1 PATCH: 14 PATCH, EXTENDED RELEASE TRANSDERMAL at 09:07

## 2017-07-09 RX ADMIN — POTASSIUM CHLORIDE 10 MEQ: 10 INJECTION, SOLUTION INTRAVENOUS at 10:07

## 2017-07-09 RX ADMIN — SODIUM CHLORIDE: 0.9 INJECTION, SOLUTION INTRAVENOUS at 03:07

## 2017-07-09 RX ADMIN — LISINOPRIL 10 MG: 10 TABLET ORAL at 09:07

## 2017-07-09 RX ADMIN — MORPHINE SULFATE 2 MG: 2 INJECTION, SOLUTION INTRAMUSCULAR; INTRAVENOUS at 09:07

## 2017-07-09 RX ADMIN — Medication 3 ML: at 10:07

## 2017-07-09 RX ADMIN — MORPHINE SULFATE 2 MG: 2 INJECTION, SOLUTION INTRAMUSCULAR; INTRAVENOUS at 12:07

## 2017-07-09 RX ADMIN — HYDRALAZINE HYDROCHLORIDE 20 MG: 20 INJECTION INTRAMUSCULAR; INTRAVENOUS at 12:07

## 2017-07-09 RX ADMIN — HYDRALAZINE HYDROCHLORIDE 20 MG: 20 INJECTION INTRAMUSCULAR; INTRAVENOUS at 05:07

## 2017-07-09 RX ADMIN — AMLODIPINE BESYLATE 10 MG: 10 TABLET ORAL at 09:07

## 2017-07-09 RX ADMIN — POTASSIUM CHLORIDE 10 MEQ: 10 INJECTION, SOLUTION INTRAVENOUS at 12:07

## 2017-07-09 RX ADMIN — POTASSIUM CHLORIDE 10 MEQ: 10 INJECTION, SOLUTION INTRAVENOUS at 09:07

## 2017-07-09 RX ADMIN — Medication 3 ML: at 02:07

## 2017-07-09 NOTE — ASSESSMENT & PLAN NOTE
53 yo woman with SBO.    - NPO, IVF  - NG tube  - serial abdominal exams  - Case request in for OR Monday at 10 am with Dr. Goldberg  - GI prophylaxis  - DVT prophylaxis

## 2017-07-09 NOTE — SUBJECTIVE & OBJECTIVE
Interval History: No acute events overnight. HTN that required multiple doses PRN hydralazine and labetalol, on home BP meds. Some abdominal pain, no nausea. NG tube in place with brown liquid output. Did pass flatus last night. No BM.    Medications:  Continuous Infusions:   sodium chloride 0.9% 125 mL/hr at 07/08/17 0442     Scheduled Meds:   amlodipine  10 mg Per NG tube Daily    cloNIDine 0.1 mg/24 hr td ptwk  1 patch Transdermal Q7 Days    enoxaparin  40 mg Subcutaneous Daily    famotidine (PF)  20 mg Intravenous BID    lisinopril  10 mg Per NG tube Daily    nicotine  1 patch Transdermal Daily    potassium chloride  10 mEq Intravenous Q1H    sodium chloride 0.9%  3 mL Intravenous Q8H    sodium phosphate IVPB  30 mmol Intravenous Once     PRN Meds:hydrALAZINE, labetalol, morphine, ondansetron, promethazine (PHENERGAN) IVPB     Review of patient's allergies indicates:   Allergen Reactions    Penicillins Hives     Objective:     Vital Signs (Most Recent):  Temp: 99.2 °F (37.3 °C) (07/09/17 0445)  Pulse: 65 (07/09/17 0700)  Resp: 14 (07/09/17 0445)  BP: 126/87 (07/09/17 0445)  SpO2: 96 % (07/09/17 0445) Vital Signs (24h Range):  Temp:  [98.3 °F (36.8 °C)-99.7 °F (37.6 °C)] 99.2 °F (37.3 °C)  Pulse:  [65-78] 65  Resp:  [14-18] 14  SpO2:  [95 %-99 %] 96 %  BP: (126-210)/(58-88) 126/87     Weight: 72.1 kg (159 lb)  Body mass index is 26.46 kg/m².    Intake/Output - Last 3 Shifts       07/07 0700 - 07/08 0659 07/08 0700 - 07/09 0659 07/09 0700 - 07/10 0659    P.O. 0 0     I.V. (mL/kg) 2502.1 (34.7) 1664.6 (23.1)     IV Piggyback  600     Total Intake(mL/kg) 2502.1 (34.7) 2264.6 (31.4)     Urine (mL/kg/hr) 700 (0.4) 900 (0.5)     Drains 550 (0.3) 900 (0.5)     Stool  0 (0)     Total Output 1250 1800      Net +1252.1 +464.6             Urine Occurrence 1 x 1 x     Stool Occurrence  0 x           Physical Exam   Constitutional: She is oriented to person, place, and time. She appears well-developed and  well-nourished.   Eyes: EOM are normal. Pupils are equal, round, and reactive to light.   Neck: Normal range of motion.   Cardiovascular: Normal rate and regular rhythm.    Pulmonary/Chest: Effort normal.   Abdominal: She exhibits distension. There is tenderness. There is no rebound and no guarding.   NG tube in place draining brown fluid,200 cc overnight  No bowel sounds   Musculoskeletal: Normal range of motion.   Neurological: She is alert and oriented to person, place, and time.   Skin: Skin is warm.   Psychiatric: She has a normal mood and affect.       Significant Labs:  CBC:     Recent Labs  Lab 07/09/17  0508   WBC 9.60   RBC 4.21   HGB 13.0   HCT 37.1      MCV 88   MCH 30.9   MCHC 35.0     CMP:   Recent Labs  Lab 07/07/17  0407  07/09/17  0508   *  < > 116*   CALCIUM 9.9  < > 8.6*   ALBUMIN 3.7  --   --    PROT 7.7  --   --      < > 142   K 4.2  < > 3.7   CO2 27  < > 25     < > 109   BUN 15  < > 18   CREATININE 1.1  < > 0.9   ALKPHOS 100  --   --    ALT 8*  --   --    AST 15  --   --    BILITOT 0.7  --   --    < > = values in this interval not displayed.    Significant Diagnostics:  none

## 2017-07-09 NOTE — PROGRESS NOTES
[facility]  General Surgery  Progress Note    Subjective:     History of Present Illness:  No notes on file    Post-Op Info:  Procedure(s) (LRB):  EXPLORATORY-LAPAROTOMY for 10 am (N/A)         Interval History: No acute events overnight. HTN that required multiple doses PRN hydralazine and labetalol, on home BP meds. Some abdominal pain, no nausea. NG tube in place with brown liquid output. Did pass flatus last night. No BM.    Medications:  Continuous Infusions:   sodium chloride 0.9% 125 mL/hr at 07/08/17 0442     Scheduled Meds:   amlodipine  10 mg Per NG tube Daily    cloNIDine 0.1 mg/24 hr td ptwk  1 patch Transdermal Q7 Days    enoxaparin  40 mg Subcutaneous Daily    famotidine (PF)  20 mg Intravenous BID    lisinopril  10 mg Per NG tube Daily    nicotine  1 patch Transdermal Daily    potassium chloride  10 mEq Intravenous Q1H    sodium chloride 0.9%  3 mL Intravenous Q8H    sodium phosphate IVPB  30 mmol Intravenous Once     PRN Meds:hydrALAZINE, labetalol, morphine, ondansetron, promethazine (PHENERGAN) IVPB     Review of patient's allergies indicates:   Allergen Reactions    Penicillins Hives     Objective:     Vital Signs (Most Recent):  Temp: 99.2 °F (37.3 °C) (07/09/17 0445)  Pulse: 65 (07/09/17 0700)  Resp: 14 (07/09/17 0445)  BP: 126/87 (07/09/17 0445)  SpO2: 96 % (07/09/17 0445) Vital Signs (24h Range):  Temp:  [98.3 °F (36.8 °C)-99.7 °F (37.6 °C)] 99.2 °F (37.3 °C)  Pulse:  [65-78] 65  Resp:  [14-18] 14  SpO2:  [95 %-99 %] 96 %  BP: (126-210)/(58-88) 126/87     Weight: 72.1 kg (159 lb)  Body mass index is 26.46 kg/m².    Intake/Output - Last 3 Shifts       07/07 0700 - 07/08 0659 07/08 0700 - 07/09 0659 07/09 0700 - 07/10 0659    P.O. 0 0     I.V. (mL/kg) 2502.1 (34.7) 1664.6 (23.1)     IV Piggyback  600     Total Intake(mL/kg) 2502.1 (34.7) 2264.6 (31.4)     Urine (mL/kg/hr) 700 (0.4) 900 (0.5)     Drains 550 (0.3) 900 (0.5)     Stool  0 (0)     Total Output 1250 1800       Net +1252.1 +464.6             Urine Occurrence 1 x 1 x     Stool Occurrence  0 x           Physical Exam   Constitutional: She is oriented to person, place, and time. She appears well-developed and well-nourished.   Eyes: EOM are normal. Pupils are equal, round, and reactive to light.   Neck: Normal range of motion.   Cardiovascular: Normal rate and regular rhythm.    Pulmonary/Chest: Effort normal.   Abdominal: She exhibits distension. There is tenderness. There is no rebound and no guarding.   NG tube in place draining brown fluid,200 cc overnight  No bowel sounds   Musculoskeletal: Normal range of motion.   Neurological: She is alert and oriented to person, place, and time.   Skin: Skin is warm.   Psychiatric: She has a normal mood and affect.       Significant Labs:  CBC:     Recent Labs  Lab 07/09/17  0508   WBC 9.60   RBC 4.21   HGB 13.0   HCT 37.1      MCV 88   MCH 30.9   MCHC 35.0     CMP:   Recent Labs  Lab 07/07/17  0407  07/09/17  0508   *  < > 116*   CALCIUM 9.9  < > 8.6*   ALBUMIN 3.7  --   --    PROT 7.7  --   --      < > 142   K 4.2  < > 3.7   CO2 27  < > 25     < > 109   BUN 15  < > 18   CREATININE 1.1  < > 0.9   ALKPHOS 100  --   --    ALT 8*  --   --    AST 15  --   --    BILITOT 0.7  --   --    < > = values in this interval not displayed.    Significant Diagnostics:  none    Assessment/Plan:     Hypophosphatasia    Replace prn        Hypokalemia    replace        Hypertension    On home BP meds  PRN labetalol and hydralazine        * Small bowel obstruction    53 yo woman with SBO.    - NPO, IVF  - NG tube  - serial abdominal exams  - Case request in for OR Monday at 10 am with Dr. Goldberg  - GI prophylaxis  - DVT prophylaxis            Violetta Miles MD  General Surgery  [facility]

## 2017-07-09 NOTE — ANESTHESIA PREPROCEDURE EVALUATION
07/09/2017  Pre-operative evaluation for Procedure(s) (LRB):  EXPLORATORY-LAPAROTOMY for 10 am (N/A)    Brisa Chavez is a 52 y.o. female with PMHx of HTN, HLD, endometriosis with ROSEY who presented with abdominal pain, nausea, and non-bilious, non-bloody vomiting x 18 hours. She was subsequently found to have SBO and is now scheduled for above procedure.     LDA:   Peripheral IV - Single Lumen 07/07/17 0000 Right Antecubital   IV Properties Placement Date/Time: 07/07/17 0000 Present Prior to Hospital Arrival?: Yes Size/Length: 18 G Orientation: Right Location: Antecubital Inserted by: EMS          NG/OG Tube 07/07/17 0633 Adams sump 18 Fr. Right nostril   Properties Placement Date/Time: 07/07/17 0633 Present Prior to Hospital Arrival?: No Inserted by: RN Insertion attempts (enter comment if more than 2 attempts): 1 Tube Type: Adams sump Tube Size (Fr.): 18 Fr. Tube Location: Right nostril         Prev airway: None documented    Drips:    sodium chloride 0.9% 125 mL/hr at 07/09/17 1509         Patient Active Problem List   Diagnosis    Stroke with cerebral ischemia    Hyperlipidemia    Malignant hypertension    Elevated TSH    Smoker    Cerebral thrombosis without mention of cerebral infarction    Cerebral microvascular disease    Cerebral arteriosclerosis    Benign hypertension    DM (diabetes mellitus)    Small bowel obstruction    Intestinal obstruction    Abdominal pain    Hypertension    Hypokalemia    Hypophosphatasia       Review of patient's allergies indicates:   Allergen Reactions    Penicillins Hives        No current facility-administered medications on file prior to encounter.      Current Outpatient Prescriptions on File Prior to Encounter   Medication Sig Dispense Refill    aspirin (ECOTRIN) 325 MG EC tablet Take 1 tablet (325 mg total) by mouth once daily. 30 tablet 11     atorvastatin (LIPITOR) 10 MG tablet Take 10 mg by mouth once daily.      benazepril-hydrochlorthiazide (LOTENSIN HCT) 20-25 mg Tab Take 1 tablet by mouth once daily. 30 tablet 11    lisinopril 10 MG tablet Take 10 mg by mouth once daily.      METFORMIN HCL (METFORMIN ORAL) Take by mouth.      oxycodone-acetaminophen (PERCOCET) 5-325 mg per tablet Take 1 tablet by mouth every 6 (six) hours as needed for Pain. No alcohol, no driving, no operating machinery, no working, no swimming, no additional Tylenol (acetaminophen) while taking this medication. 12 tablet 0       Past Surgical History:   Procedure Laterality Date    ABDOMINAL SURGERY         Social History     Social History    Marital status: Single     Spouse name: N/A    Number of children: N/A    Years of education: N/A     Occupational History    Not on file.     Social History Main Topics    Smoking status: Current Every Day Smoker     Packs/day: 0.50     Types: Cigarettes    Smokeless tobacco: Not on file    Alcohol use Yes    Drug use: No    Sexual activity: Not on file     Other Topics Concern    Not on file     Social History Narrative    No narrative on file         Vital Signs Range (Last 24H):  Temp:  [36.8 °C (98.3 °F)-37.6 °C (99.6 °F)]   Pulse:  [63-78]   Resp:  [14-18]   BP: (126-210)/(58-88)   SpO2:  [93 %-99 %]       CBC:   Recent Labs      07/08/17   0431  07/09/17   0508   WBC  8.38  9.60   RBC  4.59  4.21   HGB  14.1  13.0   HCT  40.3  37.1   PLT  275  298   MCV  88  88   MCH  30.7  30.9   MCHC  35.0  35.0       CMP:   Recent Labs      07/07/17   0407  07/08/17   0431  07/09/17   0508   NA  141  140  142   K  4.2  3.3*  3.7   CL  103  105  109   CO2  27  25  25   BUN  15  15  18   CREATININE  1.1  0.9  0.9   GLU  135*  136*  116*   MG   --   1.9  1.9   PHOS   --   2.9  2.4*   CALCIUM  9.9  9.0  8.6*   ALBUMIN  3.7   --    --    PROT  7.7   --    --    ALKPHOS  100   --    --    ALT  8*   --    --    AST  15   --    --     BILITOT  0.7   --    --        INR  No results for input(s): INR, PROTIME, APTT in the last 72 hours.    Invalid input(s): PT        Diagnostic Studies:    CT Abdomen  1.  Multiple dilated loops of small bowel within the left abdomen with transition point in the mid abdomen concerning for small bowel obstruction.    2.  Status post hysterectomy.  Left Bartholin's gland cyst.    3.  Findings suggestive of hepatic steatosis with focal region of fatty infiltration along the ligamentum teres.      EK:  Sinus bradycardia  Possible Left atrial enlargement  LVH  Septal infarct ,age undetermined  Abnormal ECG  No previous ECGs available    2D Echo:  2013:  CONCLUSIONS     1 - Concentric remodeling.     2 - Normal left ventricular function (EF 65%).     3 - Normal diastolic function.     4 - Normal right ventricular function .         Anesthesia Evaluation    I have reviewed the Patient Summary Reports.     I have reviewed the Medications.     Review of Systems  Anesthesia Hx:  No problems with previous Anesthesia Denies Hx of Anesthetic complications  Denies Family Hx of Anesthesia complications.   Denies Personal Hx of Anesthesia complications.   Hematology/Oncology:  Hematology Normal   Oncology Normal     EENT/Dental:EENT/Dental Normal   Cardiovascular:   Hypertension    Pulmonary:  Pulmonary Normal    Renal/:  Renal/ Normal     Hepatic/GI:  Hepatic/GI Normal    Musculoskeletal:  Musculoskeletal Normal    Neurological:   CVA    Endocrine:   Diabetes        Physical Exam  General:  Well nourished    Airway/Jaw/Neck:  Airway Findings: Mouth Opening: Normal Tongue: Normal  General Airway Assessment: Adult  Mallampati: III  Improves to II with phonation.  TM Distance: Normal, at least 6 cm     Eyes/Ears/Nose:  EYES/EARS/NOSE FINDINGS: Normal    Chest/Lungs:  Chest/Lungs Findings: Clear to auscultation, Normal Respiratory Rate     Heart/Vascular:  Heart Findings: Rate: Normal        Mental Status:  Mental Status  Findings:  Cooperative, Alert and Oriented         Anesthesia Plan  Type of Anesthesia, risks & benefits discussed:  Anesthesia Type:  general  Patient's Preference:   Intra-op Monitoring Plan: standard ASA monitors  Intra-op Monitoring Plan Comments:   Post Op Pain Control Plan:   Post Op Pain Control Plan Comments:   Induction:   IV  Beta Blocker:  Patient is not currently on a Beta-Blocker (No further documentation required).       Informed Consent: Patient understands risks and agrees with Anesthesia plan.  Questions answered. Anesthesia consent signed with patient.  ASA Score: 3     Day of Surgery Review of History & Physical: I have interviewed and examined the patient. I have reviewed the patient's H&P dated:    H&P update referred to the surgeon.     Anesthesia Plan Notes: Rapid Sequence.  Difficult IV. Will access port.         Ready For Surgery From Anesthesia Perspective.

## 2017-07-09 NOTE — PLAN OF CARE
Problem: Fall Risk (Adult)  Goal: Identify Related Risk Factors and Signs and Symptoms  Related risk factors and signs and symptoms are identified upon initiation of Human Response Clinical Practice Guideline (CPG)   No falls during this shift.     Problem: Bowel Obstruction (Adult)  Goal: Signs and Symptoms of Listed Potential Problems Will be Absent, Minimized or Managed (Bowel Obstruction)  Signs and symptoms of listed potential problems will be absent, minimized or managed by discharge/transition of care (reference Bowel Obstruction (Adult) CPG).  NG tube remains to low intermittent wall suction.

## 2017-07-10 ENCOUNTER — ANESTHESIA (OUTPATIENT)
Dept: SURGERY | Facility: HOSPITAL | Age: 52
DRG: 337 | End: 2017-07-10
Payer: MEDICAID

## 2017-07-10 ENCOUNTER — SURGERY (OUTPATIENT)
Age: 52
End: 2017-07-10

## 2017-07-10 PROBLEM — E83.39 HYPOPHOSPHATASIA: Status: ACTIVE | Noted: 2017-07-10

## 2017-07-10 PROBLEM — K56.609 INTESTINAL OBSTRUCTION: Status: RESOLVED | Noted: 2017-07-07 | Resolved: 2017-07-10

## 2017-07-10 PROBLEM — E87.6 HYPOKALEMIA: Status: ACTIVE | Noted: 2017-07-10

## 2017-07-10 PROBLEM — I10 HYPERTENSION: Status: ACTIVE | Noted: 2017-07-10

## 2017-07-10 LAB
ANION GAP SERPL CALC-SCNC: 9 MMOL/L
BASOPHILS # BLD AUTO: 0.01 K/UL
BASOPHILS NFR BLD: 0.1 %
BUN SERPL-MCNC: 15 MG/DL
CALCIUM SERPL-MCNC: 8.6 MG/DL
CHLORIDE SERPL-SCNC: 107 MMOL/L
CO2 SERPL-SCNC: 23 MMOL/L
CREAT SERPL-MCNC: 0.9 MG/DL
DIFFERENTIAL METHOD: ABNORMAL
EOSINOPHIL # BLD AUTO: 0.1 K/UL
EOSINOPHIL NFR BLD: 1.4 %
ERYTHROCYTE [DISTWIDTH] IN BLOOD BY AUTOMATED COUNT: 13.3 %
EST. GFR  (AFRICAN AMERICAN): >60 ML/MIN/1.73 M^2
EST. GFR  (NON AFRICAN AMERICAN): >60 ML/MIN/1.73 M^2
GLUCOSE SERPL-MCNC: 130 MG/DL
HCT VFR BLD AUTO: 33.6 %
HGB BLD-MCNC: 11.7 G/DL
LYMPHOCYTES # BLD AUTO: 2.5 K/UL
LYMPHOCYTES NFR BLD: 27.4 %
MAGNESIUM SERPL-MCNC: 1.8 MG/DL
MCH RBC QN AUTO: 30.7 PG
MCHC RBC AUTO-ENTMCNC: 34.8 %
MCV RBC AUTO: 88 FL
MONOCYTES # BLD AUTO: 0.5 K/UL
MONOCYTES NFR BLD: 5.2 %
NEUTROPHILS # BLD AUTO: 6 K/UL
NEUTROPHILS NFR BLD: 65.7 %
PHOSPHATE SERPL-MCNC: 3.1 MG/DL
PLATELET # BLD AUTO: 250 K/UL
PMV BLD AUTO: 9.6 FL
POCT GLUCOSE: 130 MG/DL (ref 70–110)
POTASSIUM SERPL-SCNC: 3.3 MMOL/L
RBC # BLD AUTO: 3.81 M/UL
SODIUM SERPL-SCNC: 139 MMOL/L
WBC # BLD AUTO: 9.1 K/UL

## 2017-07-10 PROCEDURE — 44005 FREEING OF BOWEL ADHESION: CPT | Mod: ,,, | Performed by: SURGERY

## 2017-07-10 PROCEDURE — D9220A PRA ANESTHESIA: Mod: ANES,,, | Performed by: ANESTHESIOLOGY

## 2017-07-10 PROCEDURE — 94760 N-INVAS EAR/PLS OXIMETRY 1: CPT

## 2017-07-10 PROCEDURE — 82962 GLUCOSE BLOOD TEST: CPT | Performed by: SURGERY

## 2017-07-10 PROCEDURE — 83735 ASSAY OF MAGNESIUM: CPT

## 2017-07-10 PROCEDURE — 84100 ASSAY OF PHOSPHORUS: CPT

## 2017-07-10 PROCEDURE — 36000706: Performed by: SURGERY

## 2017-07-10 PROCEDURE — 63600175 PHARM REV CODE 636 W HCPCS: Performed by: SURGERY

## 2017-07-10 PROCEDURE — 25000003 PHARM REV CODE 250: Performed by: NURSE ANESTHETIST, CERTIFIED REGISTERED

## 2017-07-10 PROCEDURE — 25000003 PHARM REV CODE 250: Performed by: STUDENT IN AN ORGANIZED HEALTH CARE EDUCATION/TRAINING PROGRAM

## 2017-07-10 PROCEDURE — 25000003 PHARM REV CODE 250: Performed by: GENERAL PRACTICE

## 2017-07-10 PROCEDURE — 25000003 PHARM REV CODE 250: Performed by: SURGERY

## 2017-07-10 PROCEDURE — 37000009 HC ANESTHESIA EA ADD 15 MINS: Performed by: SURGERY

## 2017-07-10 PROCEDURE — 63600175 PHARM REV CODE 636 W HCPCS: Performed by: STUDENT IN AN ORGANIZED HEALTH CARE EDUCATION/TRAINING PROGRAM

## 2017-07-10 PROCEDURE — D9220A PRA ANESTHESIA: Mod: CRNA,,, | Performed by: NURSE ANESTHETIST, CERTIFIED REGISTERED

## 2017-07-10 PROCEDURE — 71000039 HC RECOVERY, EACH ADD'L HOUR: Performed by: SURGERY

## 2017-07-10 PROCEDURE — 63600175 PHARM REV CODE 636 W HCPCS: Performed by: PHYSICIAN ASSISTANT

## 2017-07-10 PROCEDURE — 80048 BASIC METABOLIC PNL TOTAL CA: CPT

## 2017-07-10 PROCEDURE — 71000033 HC RECOVERY, INTIAL HOUR: Performed by: SURGERY

## 2017-07-10 PROCEDURE — 36000707: Performed by: SURGERY

## 2017-07-10 PROCEDURE — 37000008 HC ANESTHESIA 1ST 15 MINUTES: Performed by: SURGERY

## 2017-07-10 PROCEDURE — 36415 COLL VENOUS BLD VENIPUNCTURE: CPT

## 2017-07-10 PROCEDURE — 85025 COMPLETE CBC W/AUTO DIFF WBC: CPT

## 2017-07-10 PROCEDURE — 63600175 PHARM REV CODE 636 W HCPCS: Performed by: ANESTHESIOLOGY

## 2017-07-10 PROCEDURE — 20600001 HC STEP DOWN PRIVATE ROOM

## 2017-07-10 PROCEDURE — 0DNW0ZZ RELEASE PERITONEUM, OPEN APPROACH: ICD-10-PCS | Performed by: SURGERY

## 2017-07-10 PROCEDURE — 0DNS0ZZ RELEASE GREATER OMENTUM, OPEN APPROACH: ICD-10-PCS | Performed by: SURGERY

## 2017-07-10 PROCEDURE — 63600175 PHARM REV CODE 636 W HCPCS: Performed by: NURSE ANESTHETIST, CERTIFIED REGISTERED

## 2017-07-10 PROCEDURE — 0DN80ZZ RELEASE SMALL INTESTINE, OPEN APPROACH: ICD-10-PCS | Performed by: SURGERY

## 2017-07-10 PROCEDURE — 27000221 HC OXYGEN, UP TO 24 HOURS

## 2017-07-10 RX ORDER — FENTANYL CITRATE 50 UG/ML
INJECTION, SOLUTION INTRAMUSCULAR; INTRAVENOUS
Status: DISCONTINUED | OUTPATIENT
Start: 2017-07-10 | End: 2017-07-10

## 2017-07-10 RX ORDER — NALOXONE HCL 0.4 MG/ML
0.02 VIAL (ML) INJECTION
Status: DISCONTINUED | OUTPATIENT
Start: 2017-07-10 | End: 2017-07-13

## 2017-07-10 RX ORDER — DIPHENHYDRAMINE HYDROCHLORIDE 50 MG/ML
12.5 INJECTION INTRAMUSCULAR; INTRAVENOUS EVERY 4 HOURS PRN
Status: DISCONTINUED | OUTPATIENT
Start: 2017-07-10 | End: 2017-07-13

## 2017-07-10 RX ORDER — LIDOCAINE HCL/PF 100 MG/5ML
SYRINGE (ML) INTRAVENOUS
Status: DISCONTINUED | OUTPATIENT
Start: 2017-07-10 | End: 2017-07-10

## 2017-07-10 RX ORDER — POTASSIUM CHLORIDE 7.45 MG/ML
10 INJECTION INTRAVENOUS
Status: DISPENSED | OUTPATIENT
Start: 2017-07-10 | End: 2017-07-10

## 2017-07-10 RX ORDER — ONDANSETRON 2 MG/ML
INJECTION INTRAMUSCULAR; INTRAVENOUS
Status: DISCONTINUED | OUTPATIENT
Start: 2017-07-10 | End: 2017-07-10

## 2017-07-10 RX ORDER — LABETALOL HYDROCHLORIDE 5 MG/ML
10 INJECTION, SOLUTION INTRAVENOUS EVERY 6 HOURS PRN
Status: DISCONTINUED | OUTPATIENT
Start: 2017-07-10 | End: 2017-07-14 | Stop reason: HOSPADM

## 2017-07-10 RX ORDER — ROCURONIUM BROMIDE 10 MG/ML
INJECTION, SOLUTION INTRAVENOUS
Status: DISCONTINUED | OUTPATIENT
Start: 2017-07-10 | End: 2017-07-10

## 2017-07-10 RX ORDER — HYDROMORPHONE HYDROCHLORIDE 2 MG/ML
INJECTION, SOLUTION INTRAMUSCULAR; INTRAVENOUS; SUBCUTANEOUS
Status: DISCONTINUED | OUTPATIENT
Start: 2017-07-10 | End: 2017-07-10

## 2017-07-10 RX ORDER — ENOXAPARIN SODIUM 100 MG/ML
40 INJECTION SUBCUTANEOUS EVERY 24 HOURS
Status: DISCONTINUED | OUTPATIENT
Start: 2017-07-10 | End: 2017-07-14 | Stop reason: HOSPADM

## 2017-07-10 RX ORDER — GLYCOPYRROLATE 0.2 MG/ML
INJECTION INTRAMUSCULAR; INTRAVENOUS
Status: DISCONTINUED | OUTPATIENT
Start: 2017-07-10 | End: 2017-07-10

## 2017-07-10 RX ORDER — HYDROMORPHONE HCL IN 0.9% NACL 6 MG/30 ML
PATIENT CONTROLLED ANALGESIA SYRINGE INTRAVENOUS CONTINUOUS
Status: DISCONTINUED | OUTPATIENT
Start: 2017-07-10 | End: 2017-07-13

## 2017-07-10 RX ORDER — HYDROMORPHONE HYDROCHLORIDE 1 MG/ML
0.5 INJECTION, SOLUTION INTRAMUSCULAR; INTRAVENOUS; SUBCUTANEOUS ONCE
Status: COMPLETED | OUTPATIENT
Start: 2017-07-10 | End: 2017-07-10

## 2017-07-10 RX ORDER — SODIUM CHLORIDE 9 MG/ML
INJECTION, SOLUTION INTRAVENOUS CONTINUOUS PRN
Status: DISCONTINUED | OUTPATIENT
Start: 2017-07-10 | End: 2017-07-10

## 2017-07-10 RX ORDER — SUCCINYLCHOLINE CHLORIDE 20 MG/ML
INJECTION INTRAMUSCULAR; INTRAVENOUS
Status: DISCONTINUED | OUTPATIENT
Start: 2017-07-10 | End: 2017-07-10

## 2017-07-10 RX ORDER — SODIUM CHLORIDE 0.9 % (FLUSH) 0.9 %
3 SYRINGE (ML) INJECTION
Status: DISCONTINUED | OUTPATIENT
Start: 2017-07-10 | End: 2017-07-14 | Stop reason: HOSPADM

## 2017-07-10 RX ORDER — POTASSIUM CHLORIDE 7.45 MG/ML
10 INJECTION INTRAVENOUS
Status: COMPLETED | OUTPATIENT
Start: 2017-07-10 | End: 2017-07-10

## 2017-07-10 RX ORDER — NEOSTIGMINE METHYLSULFATE 1 MG/ML
INJECTION, SOLUTION INTRAVENOUS
Status: DISCONTINUED | OUTPATIENT
Start: 2017-07-10 | End: 2017-07-10

## 2017-07-10 RX ORDER — HYDROMORPHONE HYDROCHLORIDE 1 MG/ML
0.2 INJECTION, SOLUTION INTRAMUSCULAR; INTRAVENOUS; SUBCUTANEOUS EVERY 5 MIN PRN
Status: DISCONTINUED | OUTPATIENT
Start: 2017-07-10 | End: 2017-07-10 | Stop reason: HOSPADM

## 2017-07-10 RX ORDER — MIDAZOLAM HYDROCHLORIDE 1 MG/ML
INJECTION, SOLUTION INTRAMUSCULAR; INTRAVENOUS
Status: DISCONTINUED | OUTPATIENT
Start: 2017-07-10 | End: 2017-07-10

## 2017-07-10 RX ORDER — HYDRALAZINE HYDROCHLORIDE 20 MG/ML
10 INJECTION INTRAMUSCULAR; INTRAVENOUS EVERY 10 MIN PRN
Status: COMPLETED | OUTPATIENT
Start: 2017-07-10 | End: 2017-07-10

## 2017-07-10 RX ADMIN — FENTANYL CITRATE 50 MCG: 50 INJECTION, SOLUTION INTRAMUSCULAR; INTRAVENOUS at 11:07

## 2017-07-10 RX ADMIN — DEXTROSE MONOHYDRATE, SODIUM CHLORIDE, AND POTASSIUM CHLORIDE: 50; 4.5; 1.49 INJECTION, SOLUTION INTRAVENOUS at 09:07

## 2017-07-10 RX ADMIN — Medication: at 02:07

## 2017-07-10 RX ADMIN — HYDROMORPHONE HYDROCHLORIDE 0.5 MG: 2 INJECTION, SOLUTION INTRAMUSCULAR; INTRAVENOUS; SUBCUTANEOUS at 01:07

## 2017-07-10 RX ADMIN — POTASSIUM CHLORIDE 10 MEQ: 10 INJECTION, SOLUTION INTRAVENOUS at 09:07

## 2017-07-10 RX ADMIN — HYDRALAZINE HYDROCHLORIDE 10 MG: 20 INJECTION INTRAMUSCULAR; INTRAVENOUS at 02:07

## 2017-07-10 RX ADMIN — ENOXAPARIN SODIUM 40 MG: 100 INJECTION SUBCUTANEOUS at 09:07

## 2017-07-10 RX ADMIN — Medication 3 ML: at 06:07

## 2017-07-10 RX ADMIN — DEXTROSE MONOHYDRATE, SODIUM CHLORIDE, AND POTASSIUM CHLORIDE: 50; 4.5; 1.49 INJECTION, SOLUTION INTRAVENOUS at 02:07

## 2017-07-10 RX ADMIN — POTASSIUM CHLORIDE 10 MEQ: 10 INJECTION, SOLUTION INTRAVENOUS at 10:07

## 2017-07-10 RX ADMIN — MORPHINE SULFATE 2 MG: 2 INJECTION, SOLUTION INTRAMUSCULAR; INTRAVENOUS at 03:07

## 2017-07-10 RX ADMIN — BUPIVACAINE 60 ML: 13.3 INJECTION, SUSPENSION, LIPOSOMAL INFILTRATION at 12:07

## 2017-07-10 RX ADMIN — SODIUM CHLORIDE, SODIUM GLUCONATE, SODIUM ACETATE, POTASSIUM CHLORIDE, MAGNESIUM CHLORIDE, SODIUM PHOSPHATE, DIBASIC, AND POTASSIUM PHOSPHATE: .53; .5; .37; .037; .03; .012; .00082 INJECTION, SOLUTION INTRAVENOUS at 11:07

## 2017-07-10 RX ADMIN — DEXTROSE MONOHYDRATE, SODIUM CHLORIDE, AND POTASSIUM CHLORIDE: 50; 4.5; 1.49 INJECTION, SOLUTION INTRAVENOUS at 01:07

## 2017-07-10 RX ADMIN — LABETALOL HYDROCHLORIDE 10 MG: 5 INJECTION, SOLUTION INTRAVENOUS at 05:07

## 2017-07-10 RX ADMIN — ONDANSETRON 4 MG: 2 INJECTION INTRAMUSCULAR; INTRAVENOUS at 01:07

## 2017-07-10 RX ADMIN — ROCURONIUM BROMIDE 10 MG: 10 INJECTION, SOLUTION INTRAVENOUS at 12:07

## 2017-07-10 RX ADMIN — Medication 3 ML: at 10:07

## 2017-07-10 RX ADMIN — FAMOTIDINE 20 MG: 10 INJECTION, SOLUTION INTRAVENOUS at 09:07

## 2017-07-10 RX ADMIN — NEOSTIGMINE METHYLSULFATE 5 MG: 1 INJECTION INTRAVENOUS at 01:07

## 2017-07-10 RX ADMIN — LIDOCAINE HYDROCHLORIDE 50 MG: 20 INJECTION, SOLUTION INTRAVENOUS at 11:07

## 2017-07-10 RX ADMIN — POTASSIUM CHLORIDE 10 MEQ: 10 INJECTION, SOLUTION INTRAVENOUS at 06:07

## 2017-07-10 RX ADMIN — HYDROMORPHONE HYDROCHLORIDE 0.5 MG: 1 INJECTION, SOLUTION INTRAMUSCULAR; INTRAVENOUS; SUBCUTANEOUS at 06:07

## 2017-07-10 RX ADMIN — FENTANYL CITRATE 25 MCG: 50 INJECTION, SOLUTION INTRAMUSCULAR; INTRAVENOUS at 01:07

## 2017-07-10 RX ADMIN — MIDAZOLAM HYDROCHLORIDE 1 MG: 1 INJECTION, SOLUTION INTRAMUSCULAR; INTRAVENOUS at 11:07

## 2017-07-10 RX ADMIN — ROCURONIUM BROMIDE 5 MG: 10 INJECTION, SOLUTION INTRAVENOUS at 11:07

## 2017-07-10 RX ADMIN — GLYCOPYRROLATE 0.6 MG: 0.2 INJECTION, SOLUTION INTRAMUSCULAR; INTRAVENOUS at 01:07

## 2017-07-10 RX ADMIN — FAMOTIDINE 20 MG: 10 INJECTION, SOLUTION INTRAVENOUS at 08:07

## 2017-07-10 RX ADMIN — SODIUM CHLORIDE: 0.9 INJECTION, SOLUTION INTRAVENOUS at 01:07

## 2017-07-10 RX ADMIN — SUCCINYLCHOLINE CHLORIDE 140 MG: 20 INJECTION, SOLUTION INTRAMUSCULAR; INTRAVENOUS at 11:07

## 2017-07-10 RX ADMIN — MORPHINE SULFATE 2 MG: 2 INJECTION, SOLUTION INTRAMUSCULAR; INTRAVENOUS at 09:07

## 2017-07-10 RX ADMIN — MORPHINE SULFATE 2 MG: 2 INJECTION, SOLUTION INTRAMUSCULAR; INTRAVENOUS at 12:07

## 2017-07-10 RX ADMIN — FENTANYL CITRATE 50 MCG: 50 INJECTION, SOLUTION INTRAMUSCULAR; INTRAVENOUS at 01:07

## 2017-07-10 RX ADMIN — SODIUM CHLORIDE: 0.9 INJECTION, SOLUTION INTRAVENOUS at 11:07

## 2017-07-10 RX ADMIN — ROCURONIUM BROMIDE 30 MG: 10 INJECTION, SOLUTION INTRAVENOUS at 11:07

## 2017-07-10 RX ADMIN — NICOTINE 1 PATCH: 14 PATCH, EXTENDED RELEASE TRANSDERMAL at 08:07

## 2017-07-10 RX ADMIN — LABETALOL HYDROCHLORIDE 20 MG: 5 INJECTION, SOLUTION INTRAVENOUS at 01:07

## 2017-07-10 NOTE — ASSESSMENT & PLAN NOTE
53 yo woman with SBO.  - to OR today for exploratory laparotomy  - consents obtained and in chart  - NPO, IVF  - NG tube  - DVT/GI prophylaxis

## 2017-07-10 NOTE — NURSING TRANSFER
Nursing Transfer Note      7/10/2017     Transfer to [facility]    Transfer via stretcher    Transfer with cardiac monitoring    Transported by PCT    Medicines sent: yes    Chart send with patient: Yes

## 2017-07-10 NOTE — TRANSFER OF CARE
"Anesthesia Transfer of Care Note    Patient: Brisa Chavez    Procedure(s) Performed: Procedure(s) (LRB):  EXPLORATORY-LAPAROTOMY for 10 am (N/A)  LYSIS-ADHESION    Patient location: PACU    Anesthesia Type: general    Transport from OR: Transported from OR on 6-10 L/min O2 by face mask with adequate spontaneous ventilation    Post pain: adequate analgesia    Post assessment: no apparent anesthetic complications and tolerated procedure well    Post vital signs: stable    Level of consciousness: awake and oriented    Nausea/Vomiting: no nausea/vomiting    Complications: none    Transfer of care protocol was followed      Last vitals:   Visit Vitals  BP (!) 177/75   Pulse (!) 58   Temp 37 °C (98.6 °F) (Oral)   Resp 20   Ht 5' 5" (1.651 m)   Wt 68 kg (150 lb)   LMP 06/16/2016   SpO2 100%   Breastfeeding? No   BMI 24.96 kg/m²     "

## 2017-07-10 NOTE — PROGRESS NOTES
Spoke with Dr. Burroughs, pt arrived from pacu with /81 HR in the high 60s-low 70s.  Reviewed prn meds with MD.  New order noted for 10mg labetalol, ok to give with HR in 60s per MD.  Will carry out and continue to monitor.

## 2017-07-10 NOTE — BRIEF OP NOTE
[facility]  Brief Operative Note    SUMMARY     Surgery Date: 7/10/2017     Surgeon(s) and Role:     * Aydin Wilburn MD - Resident - Assisting     * Edmar Miller MD - Resident - Assisting     * Joshua Goldberg, MD - Primary    Pre-op Diagnosis:  Intestinal obstruction, unspecified type [K56.60]    Post-op Diagnosis:  Post-Op Diagnosis Codes:     * Intestinal obstruction, unspecified type [K56.60]    Procedure(s) (LRB):  EXPLORATORY-LAPAROTOMY for 10 am (N/A)  LYSIS-ADHESION    Anesthesia: General    Description of Procedure: Exploratory laparotomy and lysis of adhesions    Description of the findings of the procedure: moderate amount of adhesions noted; obstruction due to interloop adhesion; no bowel injury; serosal injuries repaired with 3-0 silk sutures.    Estimated Blood Loss: minimal         Specimens:   Specimen (12h ago through future)    None

## 2017-07-10 NOTE — PROGRESS NOTES
Latest blood [id number] @ 1515, dr Marleni oden MD happy for patient to go to floor. The rest of the vital signs remain stable, pain controlled with PCA,

## 2017-07-10 NOTE — PLAN OF CARE
Unable to complete discharge assessment due to patient not in room. Having surgery today. CM will continue to follow.        07/10/17 1250   Discharge Assessment   Assessment Type Discharge Planning Assessment

## 2017-07-10 NOTE — PROGRESS NOTES
ekg tech notified Patient please back on telebox/tech, patient seen on monitor, ok to transfer back to room

## 2017-07-10 NOTE — PLAN OF CARE
Vital signs remain stable, Pain controlled, Henriquez catheter output monitored and recorded, Handed over to floor RN staff

## 2017-07-10 NOTE — NURSING TRANSFER
Nursing Transfer Note      7/10/2017     Transfer from pacu    Transfer via stretcher    Transfer with tele/ ivf pca    Transported by pct    Medicines sent: yes    Chart send with patient: Yes    Notified: pt called family member    Patient reassessed at: 1700/10/17    Upon arrival to floor: cardiac monitor applied, patient oriented to room, call bell in reach and bed in lowest position

## 2017-07-10 NOTE — PLAN OF CARE
Problem: Patient Care Overview  Goal: Plan of Care Review  Outcome: Ongoing (interventions implemented as appropriate)  POC reviewed with pt. Who verbalized understanding. AAOx 4. Remains free of falls and injuries. Elevated BP treated with PRN meds, all other VSS. NPO, denies nausea. Complains of pain treated with PRN meds. Independent activity. NG-LIWS. Telemetry-SR. No acute events. No distress noted. AGATA.

## 2017-07-10 NOTE — OP NOTE
PREOPERATIVE DIAGNOSIS: Small bowel obstruction    POSTOPERATIVE DIAGNOSIS: Small bowel obstruction    PROCEDURE PERFORMED: Exploratory laparotomy with lysis of adhesions    ATTENDING SURGEON: Joshua Goldberg, MD (RES)    HOUSESTAFF SURGEON: Edmar Miller MD (RES)    FIRST ASSISTANT: Aydin Wilburn MD (RES)    ANESTHESIA: General endotracheal    ESTIMATED BLOOD LOSS: 15 mL    FINDINGS: Adhesive small bowel obstruction. All bowel viable; no resection performed.    COMPLICATIONS: None    DRAINS: None    SPECIMEN: None    INDICATION: Mrs. Chavez is a 52 year-old female who presented to [facility] Emergency Department 7/7/17 with a small bowel obstruction. She was admitted and given a trial of non-operative management but failed to improve. We recommended exploratory laparotomy and she agreed to proceed. She signed informed consent and expressed her understanding of the risks and benefits of the procedure.    PROCEDURE IN DETAIL: The patient was identified in preoperative holding and brought back to the operating room. She was placed supine on the operating room table and padded appropriately. Monitors were applied and there was smooth induction of general endotracheal anesthesia. Her abdomen was prepped and draped in the standard sterile surgical fashion. A timeout was performed and all team members present agreed that this was the correct procedure on the correct patient. We also confirmed administration of appropriate preoperative antibiotics.    We opened her well-healed midline incision and divided through the subcutaneous tissue with cautery. We sharply entered the abdomen under direct vision and confirmed no bowel injury with entry. We extended the fascial incision cranially and caudally. We encountered some adhesions of omentum and small bowel loops to the anterior wall which were carefully taken down with a combination of sharp dissection and cautery. Once we had completed freeing the anterior  peritoneal surface we performed adhesiolysis sharply between small bowel loops. We identified the likely point of obstruction at an interloop adhesion. Proximally to this point the small bowel was markedly dilated and distal to this point it was decompressed. All bowel was noted to be viable and we were able to follow it from the ligament of Treitz to the ileocecal valve. A few small serosal injuries were oversewn with interrupted 3-0 Silk stick ties in a Lembert fashion.    We noted excellent hemostasis.We returned the bowel to the abdomen and covered it with omentum. We confirmed the position of the nasogastric tube within the stomach. We infiltrated Exparel into both sides of the fascia in the preperitoneal space. The fascia was closed with running #1 PDS suture. The subcutaneous tissue was irrigated with saline and the skin was closed with staples.    Sterile dressings were applied. The patient was extubated in the operating room and transported to the recovery room in stable condition. All sponge, instrument, and needle counts were correct at the end of the procedure. I was present and scrubbed for the entire procedure.

## 2017-07-10 NOTE — NURSING TRANSFER
Nursing Transfer Note      7/10/2017     Transfer 655    Transfer via stretcher    Transfer with telemetry tech confirm pt on monitor,, pca,     Transported by pct    Medicines sent MIVF, dilaudid pca    Chart send with patient yes    Notified: sister    Patient reassessed at: July 10, 2017, 1600

## 2017-07-10 NOTE — PROGRESS NOTES
Dr pan with anesthesia notified that pt's bp in pacu 207/93. Pt denies pain at this time.  Awaiting new orders.

## 2017-07-10 NOTE — PROGRESS NOTES
[facility]  General Surgery  Progress Note    Subjective:     Post-Op Info:  Procedure(s) (LRB):  EXPLORATORY-LAPAROTOMY for 10 am (N/A)         Interval History:   Patient seen and examined, no acute events overnight, c/o of abdominal pain and distension this AM, unrelieved with pain medicine, NGT put out 700mL overnight, otherwise afebrile/hypertensive overnight - to OR today for exploratory laparotomy    Medications:  Continuous Infusions:   dextrose 5 % and 0.45 % NaCl with KCl 20 mEq 125 mL/hr at 07/10/17 0100     Scheduled Meds:   amlodipine  10 mg Per NG tube Daily    cloNIDine 0.1 mg/24 hr td ptwk  1 patch Transdermal Q7 Days    enoxaparin  40 mg Subcutaneous Daily    famotidine (PF)  20 mg Intravenous BID    lisinopril  10 mg Per NG tube Daily    nicotine  1 patch Transdermal Daily    sodium chloride 0.9%  3 mL Intravenous Q8H     PRN Meds:hydrALAZINE, labetalol, morphine, ondansetron, promethazine (PHENERGAN) IVPB     Review of patient's allergies indicates:   Allergen Reactions    Penicillins Hives     Objective:     Vital Signs (Most Recent):  Temp: 99.1 °F (37.3 °C) (07/10/17 0715)  Pulse: (!) 59 (07/10/17 0715)  Resp: 16 (07/10/17 0715)  BP: (!) 160/64 (07/10/17 0715)  SpO2: 96 % (07/10/17 0715) Vital Signs (24h Range):  Temp:  [98.3 °F (36.8 °C)-99.7 °F (37.6 °C)] 99.1 °F (37.3 °C)  Pulse:  [57-79] 59  Resp:  [16-19] 16  SpO2:  [93 %-98 %] 96 %  BP: (130-199)/(55-84) 160/64     Weight: 72.1 kg (159 lb)  Body mass index is 26.46 kg/m².    Intake/Output - Last 3 Shifts       07/08 0700 - 07/09 0659 07/09 0700 - 07/10 0659 07/10 0700 - 07/11 0659    P.O. 0 0     I.V. (mL/kg) 1664.6 (23.1) 2727.1 (37.8)     IV Piggyback 600 550     Total Intake(mL/kg) 2264.6 (31.4) 3277.1 (45.5)     Urine (mL/kg/hr) 900 (0.5) 0 (0)     Emesis/NG output  0 (0)     Drains 900 (0.5) 700 (0.4)     Other  0 (0)     Stool 0 (0) 0 (0)     Total Output 1800 700      Net +464.6 +2577.1              Urine Occurrence 1 x 6 x     Stool Occurrence 0 x 0 x     Emesis Occurrence  0 x           Physical Exam   Constitutional: She is oriented to person, place, and time. She appears well-developed and well-nourished.   HENT:   Head: Normocephalic and atraumatic.   Eyes: No scleral icterus.   Neck: Neck supple.   Cardiovascular: Normal rate and regular rhythm.    Pulmonary/Chest: Effort normal.   Abdominal:   Soft, +distension, +generalized TTP  NG tube in place draining brown fluid, minimal BS   Neurological: She is alert and oriented to person, place, and time.   Skin: Skin is warm and dry.   Psychiatric: She has a normal mood and affect.       Significant Labs:  CBC:   Recent Labs  Lab 07/10/17  0347   WBC 9.10   RBC 3.81*   HGB 11.7*   HCT 33.6*      MCV 88   MCH 30.7   MCHC 34.8     BMP:   Recent Labs  Lab 07/10/17  0347   *      K 3.3*      CO2 23   BUN 15   CREATININE 0.9   CALCIUM 8.6*   MG 1.8     CMP:   Recent Labs  Lab 07/07/17  0407  07/10/17  0347   *  < > 130*   CALCIUM 9.9  < > 8.6*   ALBUMIN 3.7  --   --    PROT 7.7  --   --      < > 139   K 4.2  < > 3.3*   CO2 27  < > 23     < > 107   BUN 15  < > 15   CREATININE 1.1  < > 0.9   ALKPHOS 100  --   --    ALT 8*  --   --    AST 15  --   --    BILITOT 0.7  --   --    < > = values in this interval not displayed.  LFTs:   Recent Labs  Lab 07/07/17 0407   ALT 8*   AST 15   ALKPHOS 100   BILITOT 0.7   PROT 7.7   ALBUMIN 3.7     Assessment/Plan:     Hypophosphatasia    Replace prn        Hypokalemia    replace        Hypertension    On home BP meds  PRN labetalol and hydralazine        * Small bowel obstruction    51 yo woman with SBO.  - to OR today for exploratory laparotomy  - consents obtained and in chart  - NPO, IVF  - NG tube  - DVT/GI prophylaxis              Sally Arriaga PA-C   [id number]  General Surgery  [facility]

## 2017-07-10 NOTE — ANESTHESIA POSTPROCEDURE EVALUATION
"Anesthesia Post Evaluation    Patient: Brisa Chavez    Procedure(s) Performed: Procedure(s) (LRB):  EXPLORATORY-LAPAROTOMY for 10 am (N/A)  LYSIS-ADHESION    Final Anesthesia Type: general  Patient location during evaluation: floor  Patient participation: Yes- Able to Participate  Level of consciousness: awake and alert and awake  Post-procedure vital signs: reviewed and stable  Pain management: adequate  Airway patency: patent  PONV status at discharge: No PONV  Anesthetic complications: no      Cardiovascular status: blood pressure returned to baseline  Respiratory status: unassisted and spontaneous ventilation  Hydration status: euvolemic  Follow-up not needed.        Visit Vitals  BP (!) 180/81 (BP Location: Right arm, Patient Position: Lying, BP Method: Automatic)   Pulse 76   Temp 37.6 °C (99.7 °F) (Oral)   Resp 18   Ht 5' 5" (1.651 m)   Wt 68 kg (150 lb)   LMP 06/16/2016   SpO2 96%   Breastfeeding? No   BMI 24.96 kg/m²       Pain/Jannet Score: Pain Assessment Performed: Yes (7/10/2017  4:00 PM)  Presence of Pain: complains of pain/discomfort (7/10/2017  4:00 PM)  Pain Rating Prior to Med Admin: 4 (7/10/2017  4:00 PM)  Pain Rating Post Med Admin: 7 (7/9/2017  3:40 PM)  Jannet Score: 9 (7/10/2017  4:00 PM)      "

## 2017-07-10 NOTE — SUBJECTIVE & OBJECTIVE
Interval History:   Patient seen and examined, no acute events overnight, c/o of abdominal pain and distension this AM, unrelieved with pain medicine, NGT put out 700mL overnight, otherwise afebrile/hypertensive overnight - to OR today for exploratory laparotomy    Medications:  Continuous Infusions:   dextrose 5 % and 0.45 % NaCl with KCl 20 mEq 125 mL/hr at 07/10/17 0100     Scheduled Meds:   amlodipine  10 mg Per NG tube Daily    cloNIDine 0.1 mg/24 hr td ptwk  1 patch Transdermal Q7 Days    enoxaparin  40 mg Subcutaneous Daily    famotidine (PF)  20 mg Intravenous BID    lisinopril  10 mg Per NG tube Daily    nicotine  1 patch Transdermal Daily    sodium chloride 0.9%  3 mL Intravenous Q8H     PRN Meds:hydrALAZINE, labetalol, morphine, ondansetron, promethazine (PHENERGAN) IVPB     Review of patient's allergies indicates:   Allergen Reactions    Penicillins Hives     Objective:     Vital Signs (Most Recent):  Temp: 99.1 °F (37.3 °C) (07/10/17 0715)  Pulse: (!) 59 (07/10/17 0715)  Resp: 16 (07/10/17 0715)  BP: (!) 160/64 (07/10/17 0715)  SpO2: 96 % (07/10/17 0715) Vital Signs (24h Range):  Temp:  [98.3 °F (36.8 °C)-99.7 °F (37.6 °C)] 99.1 °F (37.3 °C)  Pulse:  [57-79] 59  Resp:  [16-19] 16  SpO2:  [93 %-98 %] 96 %  BP: (130-199)/(55-84) 160/64     Weight: 72.1 kg (159 lb)  Body mass index is 26.46 kg/m².    Intake/Output - Last 3 Shifts       07/08 0700 - 07/09 0659 07/09 0700 - 07/10 0659 07/10 0700 - 07/11 0659    P.O. 0 0     I.V. (mL/kg) 1664.6 (23.1) 2727.1 (37.8)     IV Piggyback 600 550     Total Intake(mL/kg) 2264.6 (31.4) 3277.1 (45.5)     Urine (mL/kg/hr) 900 (0.5) 0 (0)     Emesis/NG output  0 (0)     Drains 900 (0.5) 700 (0.4)     Other  0 (0)     Stool 0 (0) 0 (0)     Total Output 1800 700      Net +464.6 +2577.1             Urine Occurrence 1 x 6 x     Stool Occurrence 0 x 0 x     Emesis Occurrence  0 x           Physical Exam   Constitutional: She is oriented to person, place, and time.  She appears well-developed and well-nourished.   HENT:   Head: Normocephalic and atraumatic.   Eyes: No scleral icterus.   Neck: Neck supple.   Cardiovascular: Normal rate and regular rhythm.    Pulmonary/Chest: Effort normal.   Abdominal:   Soft, +distension, +generalized TTP  NG tube in place draining brown fluid, minimal BS   Neurological: She is alert and oriented to person, place, and time.   Skin: Skin is warm and dry.   Psychiatric: She has a normal mood and affect.       Significant Labs:  CBC:   Recent Labs  Lab 07/10/17  0347   WBC 9.10   RBC 3.81*   HGB 11.7*   HCT 33.6*      MCV 88   MCH 30.7   MCHC 34.8     BMP:   Recent Labs  Lab 07/10/17  0347   *      K 3.3*      CO2 23   BUN 15   CREATININE 0.9   CALCIUM 8.6*   MG 1.8     CMP:   Recent Labs  Lab 07/07/17  0407  07/10/17  0347   *  < > 130*   CALCIUM 9.9  < > 8.6*   ALBUMIN 3.7  --   --    PROT 7.7  --   --      < > 139   K 4.2  < > 3.3*   CO2 27  < > 23     < > 107   BUN 15  < > 15   CREATININE 1.1  < > 0.9   ALKPHOS 100  --   --    ALT 8*  --   --    AST 15  --   --    BILITOT 0.7  --   --    < > = values in this interval not displayed.  LFTs:   Recent Labs  Lab 07/07/17 0407   ALT 8*   AST 15   ALKPHOS 100   BILITOT 0.7   PROT 7.7   ALBUMIN 3.7

## 2017-07-11 PROBLEM — E83.42 HYPOMAGNESEMIA: Status: ACTIVE | Noted: 2017-07-11

## 2017-07-11 PROBLEM — K56.609 INTESTINAL OBSTRUCTION: Status: ACTIVE | Noted: 2017-07-11

## 2017-07-11 LAB
ALBUMIN SERPL BCP-MCNC: 2.6 G/DL
ALP SERPL-CCNC: 62 U/L
ALT SERPL W/O P-5'-P-CCNC: <5 U/L
ANION GAP SERPL CALC-SCNC: 5 MMOL/L
ANION GAP SERPL CALC-SCNC: 6 MMOL/L
AST SERPL-CCNC: 15 U/L
BASOPHILS # BLD AUTO: 0.01 K/UL
BASOPHILS # BLD AUTO: 0.01 K/UL
BASOPHILS NFR BLD: 0.1 %
BASOPHILS NFR BLD: 0.1 %
BILIRUB SERPL-MCNC: 1.1 MG/DL
BUN SERPL-MCNC: 12 MG/DL
BUN SERPL-MCNC: 9 MG/DL
CALCIUM SERPL-MCNC: 8.1 MG/DL
CALCIUM SERPL-MCNC: 8.3 MG/DL
CHLORIDE SERPL-SCNC: 106 MMOL/L
CHLORIDE SERPL-SCNC: 106 MMOL/L
CO2 SERPL-SCNC: 25 MMOL/L
CO2 SERPL-SCNC: 25 MMOL/L
CREAT SERPL-MCNC: 0.8 MG/DL
CREAT SERPL-MCNC: 0.8 MG/DL
DIFFERENTIAL METHOD: ABNORMAL
DIFFERENTIAL METHOD: ABNORMAL
EOSINOPHIL # BLD AUTO: 0.1 K/UL
EOSINOPHIL # BLD AUTO: 0.2 K/UL
EOSINOPHIL NFR BLD: 0.9 %
EOSINOPHIL NFR BLD: 1.3 %
ERYTHROCYTE [DISTWIDTH] IN BLOOD BY AUTOMATED COUNT: 13.2 %
ERYTHROCYTE [DISTWIDTH] IN BLOOD BY AUTOMATED COUNT: 13.2 %
EST. GFR  (AFRICAN AMERICAN): >60 ML/MIN/1.73 M^2
EST. GFR  (AFRICAN AMERICAN): >60 ML/MIN/1.73 M^2
EST. GFR  (NON AFRICAN AMERICAN): >60 ML/MIN/1.73 M^2
EST. GFR  (NON AFRICAN AMERICAN): >60 ML/MIN/1.73 M^2
GLUCOSE SERPL-MCNC: 143 MG/DL
GLUCOSE SERPL-MCNC: 152 MG/DL
HCT VFR BLD AUTO: 35.8 %
HCT VFR BLD AUTO: 37.3 %
HGB BLD-MCNC: 12.9 G/DL
HGB BLD-MCNC: 13 G/DL
LYMPHOCYTES # BLD AUTO: 1.4 K/UL
LYMPHOCYTES # BLD AUTO: 2 K/UL
LYMPHOCYTES NFR BLD: 15.3 %
LYMPHOCYTES NFR BLD: 16.2 %
MAGNESIUM SERPL-MCNC: 1.7 MG/DL
MAGNESIUM SERPL-MCNC: 2.3 MG/DL
MCH RBC QN AUTO: 30.3 PG
MCH RBC QN AUTO: 31.1 PG
MCHC RBC AUTO-ENTMCNC: 34.9 %
MCHC RBC AUTO-ENTMCNC: 36 %
MCV RBC AUTO: 86 FL
MCV RBC AUTO: 87 FL
MONOCYTES # BLD AUTO: 0.6 K/UL
MONOCYTES # BLD AUTO: 0.7 K/UL
MONOCYTES NFR BLD: 6.1 %
MONOCYTES NFR BLD: 6.7 %
NEUTROPHILS # BLD AUTO: 7.1 K/UL
NEUTROPHILS # BLD AUTO: 9.2 K/UL
NEUTROPHILS NFR BLD: 76.1 %
NEUTROPHILS NFR BLD: 76.9 %
PHOSPHATE SERPL-MCNC: 2.4 MG/DL
PHOSPHATE SERPL-MCNC: 2.8 MG/DL
PLATELET # BLD AUTO: 261 K/UL
PLATELET # BLD AUTO: 286 K/UL
PMV BLD AUTO: 9.7 FL
PMV BLD AUTO: 9.8 FL
POCT GLUCOSE: 132 MG/DL (ref 70–110)
POTASSIUM SERPL-SCNC: 3.8 MMOL/L
POTASSIUM SERPL-SCNC: 4 MMOL/L
PROT SERPL-MCNC: 5.7 G/DL
RBC # BLD AUTO: 4.15 M/UL
RBC # BLD AUTO: 4.29 M/UL
SODIUM SERPL-SCNC: 136 MMOL/L
SODIUM SERPL-SCNC: 137 MMOL/L
TROPONIN I SERPL DL<=0.01 NG/ML-MCNC: <0.006 NG/ML
WBC # BLD AUTO: 12.06 K/UL
WBC # BLD AUTO: 9.17 K/UL

## 2017-07-11 PROCEDURE — 84100 ASSAY OF PHOSPHORUS: CPT | Mod: 91

## 2017-07-11 PROCEDURE — 84100 ASSAY OF PHOSPHORUS: CPT

## 2017-07-11 PROCEDURE — 93005 ELECTROCARDIOGRAM TRACING: CPT

## 2017-07-11 PROCEDURE — 85025 COMPLETE CBC W/AUTO DIFF WBC: CPT | Mod: 91

## 2017-07-11 PROCEDURE — 63600175 PHARM REV CODE 636 W HCPCS: Performed by: SURGERY

## 2017-07-11 PROCEDURE — 20600001 HC STEP DOWN PRIVATE ROOM

## 2017-07-11 PROCEDURE — 63600175 PHARM REV CODE 636 W HCPCS: Performed by: PHYSICIAN ASSISTANT

## 2017-07-11 PROCEDURE — 80048 BASIC METABOLIC PNL TOTAL CA: CPT

## 2017-07-11 PROCEDURE — 25000003 PHARM REV CODE 250: Performed by: GENERAL PRACTICE

## 2017-07-11 PROCEDURE — 83735 ASSAY OF MAGNESIUM: CPT | Mod: 91

## 2017-07-11 PROCEDURE — 36415 COLL VENOUS BLD VENIPUNCTURE: CPT

## 2017-07-11 PROCEDURE — 99900035 HC TECH TIME PER 15 MIN (STAT)

## 2017-07-11 PROCEDURE — 83735 ASSAY OF MAGNESIUM: CPT

## 2017-07-11 PROCEDURE — 25000003 PHARM REV CODE 250: Performed by: SURGERY

## 2017-07-11 PROCEDURE — 80053 COMPREHEN METABOLIC PANEL: CPT

## 2017-07-11 PROCEDURE — 93010 ELECTROCARDIOGRAM REPORT: CPT | Mod: ,,, | Performed by: INTERNAL MEDICINE

## 2017-07-11 PROCEDURE — 25000003 PHARM REV CODE 250: Performed by: STUDENT IN AN ORGANIZED HEALTH CARE EDUCATION/TRAINING PROGRAM

## 2017-07-11 PROCEDURE — 84484 ASSAY OF TROPONIN QUANT: CPT

## 2017-07-11 PROCEDURE — 63600175 PHARM REV CODE 636 W HCPCS: Performed by: STUDENT IN AN ORGANIZED HEALTH CARE EDUCATION/TRAINING PROGRAM

## 2017-07-11 RX ORDER — MAGNESIUM SULFATE HEPTAHYDRATE 40 MG/ML
2 INJECTION, SOLUTION INTRAVENOUS ONCE
Status: COMPLETED | OUTPATIENT
Start: 2017-07-11 | End: 2017-07-11

## 2017-07-11 RX ORDER — AMLODIPINE BESYLATE 10 MG/1
10 TABLET ORAL DAILY
Status: DISCONTINUED | OUTPATIENT
Start: 2017-07-12 | End: 2017-07-14 | Stop reason: HOSPADM

## 2017-07-11 RX ORDER — METOCLOPRAMIDE HYDROCHLORIDE 5 MG/ML
5 INJECTION INTRAMUSCULAR; INTRAVENOUS EVERY 6 HOURS PRN
Status: DISCONTINUED | OUTPATIENT
Start: 2017-07-11 | End: 2017-07-14 | Stop reason: HOSPADM

## 2017-07-11 RX ORDER — ONDANSETRON 2 MG/ML
4 INJECTION INTRAMUSCULAR; INTRAVENOUS EVERY 6 HOURS PRN
Status: DISCONTINUED | OUTPATIENT
Start: 2017-07-11 | End: 2017-07-14 | Stop reason: HOSPADM

## 2017-07-11 RX ORDER — ACETAMINOPHEN 10 MG/ML
1000 INJECTION, SOLUTION INTRAVENOUS ONCE
Status: DISCONTINUED | OUTPATIENT
Start: 2017-07-11 | End: 2017-07-11

## 2017-07-11 RX ADMIN — SODIUM PHOSPHATE, MONOBASIC, MONOHYDRATE 39.99 MMOL: 276; 142 INJECTION, SOLUTION INTRAVENOUS at 05:07

## 2017-07-11 RX ADMIN — LISINOPRIL 10 MG: 10 TABLET ORAL at 09:07

## 2017-07-11 RX ADMIN — NICOTINE 1 PATCH: 14 PATCH, EXTENDED RELEASE TRANSDERMAL at 09:07

## 2017-07-11 RX ADMIN — DEXTROSE MONOHYDRATE, SODIUM CHLORIDE, AND POTASSIUM CHLORIDE: 50; 4.5; 1.49 INJECTION, SOLUTION INTRAVENOUS at 05:07

## 2017-07-11 RX ADMIN — FAMOTIDINE 20 MG: 10 INJECTION, SOLUTION INTRAVENOUS at 09:07

## 2017-07-11 RX ADMIN — MAGNESIUM SULFATE IN WATER 2 G: 40 INJECTION, SOLUTION INTRAVENOUS at 09:07

## 2017-07-11 RX ADMIN — Medication 3 ML: at 03:07

## 2017-07-11 RX ADMIN — HYDRALAZINE HYDROCHLORIDE 20 MG: 20 INJECTION INTRAMUSCULAR; INTRAVENOUS at 12:07

## 2017-07-11 RX ADMIN — DEXTROSE MONOHYDRATE, SODIUM CHLORIDE, AND POTASSIUM CHLORIDE: 50; 4.5; 1.49 INJECTION, SOLUTION INTRAVENOUS at 03:07

## 2017-07-11 RX ADMIN — AMLODIPINE BESYLATE 10 MG: 10 TABLET ORAL at 09:07

## 2017-07-11 RX ADMIN — Medication: at 09:07

## 2017-07-11 RX ADMIN — Medication 3 ML: at 06:07

## 2017-07-11 RX ADMIN — ENOXAPARIN SODIUM 40 MG: 100 INJECTION SUBCUTANEOUS at 04:07

## 2017-07-11 RX ADMIN — SODIUM CHLORIDE 1000 ML: 0.9 INJECTION, SOLUTION INTRAVENOUS at 06:07

## 2017-07-11 NOTE — SUBJECTIVE & OBJECTIVE
Interval History:   Patient seen and examined, no acute events overnight, denies N/V, abdominal pain well controlled with PCA, NGT in place - put out 350mL overnight, +F/no BM    Medications:  Continuous Infusions:   dextrose 5 % and 0.45 % NaCl with KCl 20 mEq 125 mL/hr at 07/11/17 0538    hydromorphone in 0.9 % NaCl 6 mg/30 ml       Scheduled Meds:   acetaminophen  1,000 mg Intravenous Once    amlodipine  10 mg Per NG tube Daily    cloNIDine 0.1 mg/24 hr td ptwk  1 patch Transdermal Q7 Days    enoxaparin  40 mg Subcutaneous Daily    famotidine (PF)  20 mg Intravenous BID    lisinopril  10 mg Per NG tube Daily    nicotine  1 patch Transdermal Daily    sodium chloride 0.9%  3 mL Intravenous Q8H     PRN Meds:diphenhydrAMINE, hydrALAZINE, labetalol, metoclopramide HCl, morphine, naloxone, ondansetron, ondansetron, promethazine (PHENERGAN) IVPB, sodium chloride 0.9%, sodium chloride 0.9%     Review of patient's allergies indicates:   Allergen Reactions    Penicillins Hives     Objective:     Vital Signs (Most Recent):  Temp: 99.3 °F (37.4 °C) (07/11/17 0510)  Pulse: 70 (07/11/17 0510)  Resp: 17 (07/11/17 0510)  BP: (!) 156/74 (07/11/17 0510)  SpO2: (!) 91 % (07/11/17 0510) Vital Signs (24h Range):  Temp:  [97.8 °F (36.6 °C)-100.2 °F (37.9 °C)] 99.3 °F (37.4 °C)  Pulse:  [58-76] 70  Resp:  [13-20] 17  SpO2:  [91 %-100 %] 91 %  BP: (152-207)/(55-93) 156/74     Weight: 68 kg (150 lb)  Body mass index is 24.96 kg/m².    Intake/Output - Last 3 Shifts       07/09 0700 - 07/10 0659 07/10 0700 - 07/11 0659 07/11 0700 - 07/12 0659    P.O. 0 0     I.V. (mL/kg) 2727.1 (37.8) 3652 (53.7)     IV Piggyback 550      Total Intake(mL/kg) 3277.1 (45.5) 3652 (53.7)     Urine (mL/kg/hr) 0 (0) 970 (0.6)     Emesis/NG output 0 (0)      Drains 700 (0.4) 350 (0.2)     Other 0 (0)      Stool 0 (0)      Total Output 700 1320      Net +2577.1 +2332             Urine Occurrence 6 x      Stool Occurrence 0 x      Emesis Occurrence 0 x             Physical Exam   Constitutional: She is oriented to person, place, and time. She appears well-developed and well-nourished.   HENT:   Head: Normocephalic and atraumatic.   Eyes: No scleral icterus.   Neck: Neck supple.   Cardiovascular: Normal rate and regular rhythm.    Pulmonary/Chest: Effort normal.   Abdominal:   Soft, appropriate TTP, surgical dressing in place - clean, dry and intact     Neurological: She is alert and oriented to person, place, and time.   Skin: Skin is warm and dry.   Psychiatric: She has a normal mood and affect.       Significant Labs:  CBC:   Recent Labs  Lab 07/11/17 0354   WBC 9.17   RBC 4.15   HGB 12.9   HCT 35.8*      MCV 86   MCH 31.1*   MCHC 36.0     BMP:   Recent Labs  Lab 07/11/17 0354   *      K 4.0      CO2 25   BUN 12   CREATININE 0.8   CALCIUM 8.1*   MG 1.7     CMP:   Recent Labs  Lab 07/07/17 0407 07/11/17 0354   *  < > 152*   CALCIUM 9.9  < > 8.1*   ALBUMIN 3.7  --   --    PROT 7.7  --   --      < > 136   K 4.2  < > 4.0   CO2 27  < > 25     < > 106   BUN 15  < > 12   CREATININE 1.1  < > 0.8   ALKPHOS 100  --   --    ALT 8*  --   --    AST 15  --   --    BILITOT 0.7  --   --    < > = values in this interval not displayed.  LFTs:   Recent Labs  Lab 07/07/17 0407   ALT 8*   AST 15   ALKPHOS 100   BILITOT 0.7   PROT 7.7   ALBUMIN 3.7

## 2017-07-11 NOTE — PLAN OF CARE
Unable to complete discharge assessment due to patient w/c/o SOB, CP;Dr. Bhatti & floor nurse at . CM will continue to follow.        07/11/17 4580   Discharge Assessment   Assessment Type Discharge Planning Assessment

## 2017-07-11 NOTE — ASSESSMENT & PLAN NOTE
51 yo woman with SBO.  - s/p ex lap with LUCILLE 7/10/17  - NGT came out this morning, will not replace unless N/V develops  - NPO/IVF - bolus this AM  - d/c boyd  - continue PCA  - nausea meds PRN

## 2017-07-11 NOTE — PROGRESS NOTES
"Pt called complaints of chest pain. Upon questioning the patient she states "heart beating fast" and a little shortness of breath. Vital signs noted in chart with improvement of SBP form 179 to 145. Hydralazine IV given at 1226. Heart form 74 to 79. O2 sat 90% on room air,  2L/NC applied to patient, currently at 95%. EKG, chest xray, abdominal xray, and labs. Dr. Miles called and at bedside.   "

## 2017-07-11 NOTE — PLAN OF CARE
Problem: Patient Care Overview  Goal: Plan of Care Review  Outcome: Ongoing (interventions implemented as appropriate)  POC reviewed with patient who verbalized understanding. VSS, afebrile. AAOx4. Remain free of falls and injury. ML with telfa, dried drainage marked, no new drainage during shift. NG to R nare with small greenish output. Henriquez in place, care performed. NPO, denies nausea. Pain controlled with PCA per MAR. Telemetry being monitored running NSR.  Educated on IS use. Patient denies chest pain & SOB. Patient refuses TEDS & SCDs. No acute events. No distress noted. Bed in lowest position, call light within reach, frequent rounds made for safety. WCTM.

## 2017-07-11 NOTE — PLAN OF CARE
Problem: Patient Care Overview  Goal: Plan of Care Review  Outcome: Revised  Care plan reviewed and understood by patient. Resp rate even and unlabored. Pt with no reports of nausea. See previous note regarding blood pressure. Pt remain free of falls. No acute pain or distress noted. Will continue to monitor.

## 2017-07-11 NOTE — PROGRESS NOTES
[facility]  General Surgery  Progress Note    Subjective:     Post-Op Info:  Procedure(s) (LRB):  EXPLORATORY-LAPAROTOMY for 10 am (N/A)  LYSIS-ADHESION   1 Day Post-Op     Interval History:   Patient seen and examined, no acute events overnight, denies N/V, abdominal pain well controlled with PCA, NGT in place - put out 350mL overnight, +F/no BM    Medications:  Continuous Infusions:   dextrose 5 % and 0.45 % NaCl with KCl 20 mEq 125 mL/hr at 07/11/17 0538    hydromorphone in 0.9 % NaCl 6 mg/30 ml       Scheduled Meds:   acetaminophen  1,000 mg Intravenous Once    amlodipine  10 mg Per NG tube Daily    cloNIDine 0.1 mg/24 hr td ptwk  1 patch Transdermal Q7 Days    enoxaparin  40 mg Subcutaneous Daily    famotidine (PF)  20 mg Intravenous BID    lisinopril  10 mg Per NG tube Daily    nicotine  1 patch Transdermal Daily    sodium chloride 0.9%  3 mL Intravenous Q8H     PRN Meds:diphenhydrAMINE, hydrALAZINE, labetalol, metoclopramide HCl, morphine, naloxone, ondansetron, ondansetron, promethazine (PHENERGAN) IVPB, sodium chloride 0.9%, sodium chloride 0.9%     Review of patient's allergies indicates:   Allergen Reactions    Penicillins Hives     Objective:     Vital Signs (Most Recent):  Temp: 99.3 °F (37.4 °C) (07/11/17 0510)  Pulse: 70 (07/11/17 0510)  Resp: 17 (07/11/17 0510)  BP: (!) 156/74 (07/11/17 0510)  SpO2: (!) 91 % (07/11/17 0510) Vital Signs (24h Range):  Temp:  [97.8 °F (36.6 °C)-100.2 °F (37.9 °C)] 99.3 °F (37.4 °C)  Pulse:  [58-76] 70  Resp:  [13-20] 17  SpO2:  [91 %-100 %] 91 %  BP: (152-207)/(55-93) 156/74     Weight: 68 kg (150 lb)  Body mass index is 24.96 kg/m².    Intake/Output - Last 3 Shifts       07/09 0700 - 07/10 0659 07/10 0700 - 07/11 0659 07/11 0700 - 07/12 0659    P.O. 0 0     I.V. (mL/kg) 2727.1 (37.8) 3652 (53.7)     IV Piggyback 550      Total Intake(mL/kg) 3277.1 (45.5) 3652 (53.7)     Urine (mL/kg/hr) 0 (0) 970 (0.6)     Emesis/NG output 0 (0)       Drains 700 (0.4) 350 (0.2)     Other 0 (0)      Stool 0 (0)      Total Output 700 1320      Net +2577.1 +2332             Urine Occurrence 6 x      Stool Occurrence 0 x      Emesis Occurrence 0 x            Physical Exam   Constitutional: She is oriented to person, place, and time. She appears well-developed and well-nourished.   HENT:   Head: Normocephalic and atraumatic.   Eyes: No scleral icterus.   Neck: Neck supple.   Cardiovascular: Normal rate and regular rhythm.    Pulmonary/Chest: Effort normal.   Abdominal:   Soft, appropriate TTP, surgical dressing in place - clean, dry and intact     Neurological: She is alert and oriented to person, place, and time.   Skin: Skin is warm and dry.   Psychiatric: She has a normal mood and affect.       Significant Labs:  CBC:   Recent Labs  Lab 07/11/17 0354   WBC 9.17   RBC 4.15   HGB 12.9   HCT 35.8*      MCV 86   MCH 31.1*   MCHC 36.0     BMP:   Recent Labs  Lab 07/11/17 0354   *      K 4.0      CO2 25   BUN 12   CREATININE 0.8   CALCIUM 8.1*   MG 1.7     CMP:   Recent Labs  Lab 07/07/17  0407  07/11/17 0354   *  < > 152*   CALCIUM 9.9  < > 8.1*   ALBUMIN 3.7  --   --    PROT 7.7  --   --      < > 136   K 4.2  < > 4.0   CO2 27  < > 25     < > 106   BUN 15  < > 12   CREATININE 1.1  < > 0.8   ALKPHOS 100  --   --    ALT 8*  --   --    AST 15  --   --    BILITOT 0.7  --   --    < > = values in this interval not displayed.  LFTs:   Recent Labs  Lab 07/07/17 0407   ALT 8*   AST 15   ALKPHOS 100   BILITOT 0.7   PROT 7.7   ALBUMIN 3.7     Assessment/Plan:     Hypomagnesemia    replace        Hypophosphatasia    Replace prn        Hypokalemia    replace        Hypertension    On home BP meds  PRN labetalol and hydralazine        * Small bowel obstruction    51 yo woman with SBO.  - s/p ex lap with LUCILLE 7/10/17  - NGT came out this morning, will not replace unless N/V develops  - NPO/IVF - bolus this AM  - d/c boyd  - continue  PCA  - nausea meds PRN            Sally Arriaga PA-C   [id number]  General Surgery  [facility]

## 2017-07-12 LAB
ANION GAP SERPL CALC-SCNC: 8 MMOL/L
BASOPHILS # BLD AUTO: 0.01 K/UL
BASOPHILS NFR BLD: 0.1 %
BUN SERPL-MCNC: 6 MG/DL
CALCIUM SERPL-MCNC: 8.5 MG/DL
CHLORIDE SERPL-SCNC: 106 MMOL/L
CO2 SERPL-SCNC: 24 MMOL/L
CREAT SERPL-MCNC: 0.8 MG/DL
DIFFERENTIAL METHOD: ABNORMAL
EOSINOPHIL # BLD AUTO: 0.2 K/UL
EOSINOPHIL NFR BLD: 2.2 %
ERYTHROCYTE [DISTWIDTH] IN BLOOD BY AUTOMATED COUNT: 13 %
EST. GFR  (AFRICAN AMERICAN): >60 ML/MIN/1.73 M^2
EST. GFR  (NON AFRICAN AMERICAN): >60 ML/MIN/1.73 M^2
GLUCOSE SERPL-MCNC: 132 MG/DL
HCT VFR BLD AUTO: 32.8 %
HGB BLD-MCNC: 11.6 G/DL
LYMPHOCYTES # BLD AUTO: 1.3 K/UL
LYMPHOCYTES NFR BLD: 12.8 %
MAGNESIUM SERPL-MCNC: 1.9 MG/DL
MCH RBC QN AUTO: 30.6 PG
MCHC RBC AUTO-ENTMCNC: 35.4 %
MCV RBC AUTO: 87 FL
MONOCYTES # BLD AUTO: 0.8 K/UL
MONOCYTES NFR BLD: 7.3 %
NEUTROPHILS # BLD AUTO: 8 K/UL
NEUTROPHILS NFR BLD: 77.5 %
PHOSPHATE SERPL-MCNC: 2.8 MG/DL
PLATELET # BLD AUTO: 254 K/UL
PMV BLD AUTO: 9.9 FL
POTASSIUM SERPL-SCNC: 3.6 MMOL/L
RBC # BLD AUTO: 3.79 M/UL
SODIUM SERPL-SCNC: 138 MMOL/L
WBC # BLD AUTO: 10.3 K/UL

## 2017-07-12 PROCEDURE — 83735 ASSAY OF MAGNESIUM: CPT

## 2017-07-12 PROCEDURE — 84100 ASSAY OF PHOSPHORUS: CPT

## 2017-07-12 PROCEDURE — 36415 COLL VENOUS BLD VENIPUNCTURE: CPT

## 2017-07-12 PROCEDURE — 20600001 HC STEP DOWN PRIVATE ROOM

## 2017-07-12 PROCEDURE — 25000003 PHARM REV CODE 250: Performed by: STUDENT IN AN ORGANIZED HEALTH CARE EDUCATION/TRAINING PROGRAM

## 2017-07-12 PROCEDURE — 80048 BASIC METABOLIC PNL TOTAL CA: CPT

## 2017-07-12 PROCEDURE — 85025 COMPLETE CBC W/AUTO DIFF WBC: CPT

## 2017-07-12 PROCEDURE — 63600175 PHARM REV CODE 636 W HCPCS: Performed by: SURGERY

## 2017-07-12 PROCEDURE — 25000003 PHARM REV CODE 250: Performed by: SURGERY

## 2017-07-12 PROCEDURE — 25000003 PHARM REV CODE 250: Performed by: GENERAL PRACTICE

## 2017-07-12 PROCEDURE — 63600175 PHARM REV CODE 636 W HCPCS: Performed by: PHYSICIAN ASSISTANT

## 2017-07-12 RX ORDER — POTASSIUM CHLORIDE 7.45 MG/ML
10 INJECTION INTRAVENOUS
Status: COMPLETED | OUTPATIENT
Start: 2017-07-12 | End: 2017-07-12

## 2017-07-12 RX ORDER — HYDROMORPHONE HYDROCHLORIDE 1 MG/ML
0.5 INJECTION, SOLUTION INTRAMUSCULAR; INTRAVENOUS; SUBCUTANEOUS ONCE
Status: COMPLETED | OUTPATIENT
Start: 2017-07-12 | End: 2017-07-12

## 2017-07-12 RX ORDER — DIAZEPAM 5 MG/5ML
1 SOLUTION ORAL EVERY 6 HOURS
Status: DISPENSED | OUTPATIENT
Start: 2017-07-12 | End: 2017-07-13

## 2017-07-12 RX ADMIN — LISINOPRIL 10 MG: 10 TABLET ORAL at 09:07

## 2017-07-12 RX ADMIN — HYDROMORPHONE HYDROCHLORIDE 0.5 MG: 1 INJECTION, SOLUTION INTRAMUSCULAR; INTRAVENOUS; SUBCUTANEOUS at 05:07

## 2017-07-12 RX ADMIN — FAMOTIDINE 20 MG: 10 INJECTION, SOLUTION INTRAVENOUS at 09:07

## 2017-07-12 RX ADMIN — POTASSIUM CHLORIDE 10 MEQ: 10 INJECTION, SOLUTION INTRAVENOUS at 12:07

## 2017-07-12 RX ADMIN — Medication 3 ML: at 06:07

## 2017-07-12 RX ADMIN — DIAZEPAM 1 MG: 5 SOLUTION ORAL at 05:07

## 2017-07-12 RX ADMIN — FAMOTIDINE 20 MG: 10 INJECTION, SOLUTION INTRAVENOUS at 08:07

## 2017-07-12 RX ADMIN — POTASSIUM CHLORIDE 10 MEQ: 10 INJECTION, SOLUTION INTRAVENOUS at 01:07

## 2017-07-12 RX ADMIN — POTASSIUM CHLORIDE 10 MEQ: 10 INJECTION, SOLUTION INTRAVENOUS at 09:07

## 2017-07-12 RX ADMIN — DEXTROSE MONOHYDRATE, SODIUM CHLORIDE, AND POTASSIUM CHLORIDE: 50; 4.5; 1.49 INJECTION, SOLUTION INTRAVENOUS at 05:07

## 2017-07-12 RX ADMIN — AMLODIPINE BESYLATE 10 MG: 10 TABLET ORAL at 09:07

## 2017-07-12 RX ADMIN — DEXTROSE MONOHYDRATE, SODIUM CHLORIDE, AND POTASSIUM CHLORIDE: 50; 4.5; 1.49 INJECTION, SOLUTION INTRAVENOUS at 08:07

## 2017-07-12 RX ADMIN — ENOXAPARIN SODIUM 40 MG: 100 INJECTION SUBCUTANEOUS at 05:07

## 2017-07-12 RX ADMIN — POTASSIUM CHLORIDE 10 MEQ: 10 INJECTION, SOLUTION INTRAVENOUS at 02:07

## 2017-07-12 RX ADMIN — DEXTROSE MONOHYDRATE, SODIUM CHLORIDE, AND POTASSIUM CHLORIDE: 50; 4.5; 1.49 INJECTION, SOLUTION INTRAVENOUS at 09:07

## 2017-07-12 NOTE — PLAN OF CARE
Patient lives in a 1 story house w/SO. Not medically stable for discharge;POD # 2: waiting for bowel function to return. No needs determined. CM will continue to follow.     [facility] given to patient after informed about it;patient verbalized their understanding.        07/12/17 1255   Discharge Assessment   Assessment Type Discharge Planning Assessment   Confirmed/corrected address and phone number on facesheet? Yes   Assessment information obtained from? Patient;Medical Record   Expected Length of Stay (days) (5-7)   Communicated expected length of stay with patient/caregiver yes   Type of Healthcare Directive Received (Unknown)   Prior to hospitilization cognitive status: Alert/Oriented;No Deficits   Prior to hospitalization functional status: Independent   Current cognitive status: Alert/Oriented;No Deficits   Current Functional Status: Independent;Needs Assistance   Arrived From home or self-care   Lives With significant other   Able to Return to Prior Arrangements yes   Is patient able to care for self after discharge? Yes   How many people do you have in your home that can help with your care after discharge? 1   Who are your caregiver(s) and their phone number(s)? (Significant other (SO): Darrell SONG [phone])   Patient's perception of discharge disposition home or selfcare   Readmission Within The Last 30 Days no previous admission in last 30 days   Patient currently being followed by outpatient case management? No   Patient currently receives home health services? No   Does the patient currently use HME? No   Patient currently receives private duty nursing? N/A   Patient currently receives any other outside agency services? No   Equipment Currently Used at Home none   Do you have any problems affording any of your prescribed medications? No   Is the patient taking medications as prescribed? yes   Do you have any financial concerns preventing you from receiving the healthcare you need? No  "  Does the patient have transportation to healthcare appointments? Yes   Transportation Available taxi  (Patient states,"I live a few blocks from hospital;we will walk or catch a cab.")   On Dialysis? No   Does the patient receive services at the [facility]madin Clinic? No   Are there any open cases? No   Discharge Plan A Home with family   Discharge Plan B Home with family   Patient/Family In Agreement With Plan yes     "

## 2017-07-12 NOTE — ASSESSMENT & PLAN NOTE
51 yo woman with SBO.  - s/p ex lap with LUCILLE 7/10/17  - will start chips and sips   - continue IVF  - continue PCA  - nausea meds PRN  - will add valium scheduled for 4 doses for muscle cramps  - PT/OT

## 2017-07-12 NOTE — PROGRESS NOTES
[facility]  General Surgery  Progress Note    Subjective:       Post-Op Info:  Procedure(s) (LRB):  EXPLORATORY-LAPAROTOMY for 10 am (N/A)  LYSIS-ADHESION   2 Days Post-Op     Interval History:   Patient seen and examined, no acute events overnight, denies N/V, pain well controlled with PCA, but still having muscle cramps, denies further episodes of chest pain/SOB, tele remains in NSR    Medications:  Continuous Infusions:   dextrose 5 % and 0.45 % NaCl with KCl 20 mEq 125 mL/hr at 07/12/17 0505    hydromorphone in 0.9 % NaCl 6 mg/30 ml       Scheduled Meds:   amlodipine  10 mg Oral Daily    cloNIDine 0.1 mg/24 hr td ptwk  1 patch Transdermal Q7 Days    enoxaparin  40 mg Subcutaneous Daily    famotidine (PF)  20 mg Intravenous BID    lisinopril  10 mg Per NG tube Daily    nicotine  1 patch Transdermal Daily    sodium chloride 0.9%  3 mL Intravenous Q8H     PRN Meds:diphenhydrAMINE, hydrALAZINE, labetalol, metoclopramide HCl, morphine, naloxone, ondansetron, ondansetron, promethazine (PHENERGAN) IVPB, sodium chloride 0.9%, sodium chloride 0.9%     Review of patient's allergies indicates:   Allergen Reactions    Penicillins Hives     Objective:     Vital Signs (Most Recent):  Temp: 99.2 °F (37.3 °C) (07/12/17 0609)  Pulse: 72 (07/12/17 0700)  Resp: 17 (07/12/17 0609)  BP: (!) 160/70 (07/12/17 0609)  SpO2: (!) 91 % (07/12/17 0057) Vital Signs (24h Range):  Temp:  [98.4 °F (36.9 °C)-99.6 °F (37.6 °C)] 99.2 °F (37.3 °C)  Pulse:  [71-93] 72  Resp:  [16-24] 17  SpO2:  [90 %-95 %] 91 %  BP: (145-184)/(68-85) 160/70     Weight: 68 kg (150 lb)  Body mass index is 24.96 kg/m².    Intake/Output - Last 3 Shifts       07/10 0700 - 07/11 0659 07/11 0700 - 07/12 0659 07/12 0700 - 07/13 0659    P.O. 0 0     I.V. (mL/kg) 3652 (53.7) 2931.3 (43.1)     IV Piggyback       Total Intake(mL/kg) 3652 (53.7) 2931.3 (43.1)     Urine (mL/kg/hr) 970 (0.6) 670 (0.4)     Emesis/NG output       Drains 350 (0.2)       Other       Stool  0 (0)     Total Output 1320 670      Net +2332 +2261.3             Urine Occurrence  3 x     Stool Occurrence  1 x           Physical Exam   Constitutional: She is oriented to person, place, and time. She appears well-developed and well-nourished.   HENT:   Head: Normocephalic and atraumatic.   Eyes: No scleral icterus.   Neck: Neck supple.   Cardiovascular: Normal rate and regular rhythm.    Pulmonary/Chest: Effort normal.   Abdominal:   Soft, appropriate TTP, incision with staples - clean, dry and intact   Neurological: She is alert and oriented to person, place, and time.   Skin: Skin is warm and dry.   Psychiatric: She has a normal mood and affect.       Significant Labs:  CBC:   Recent Labs  Lab 07/12/17  0503   WBC 10.30   RBC 3.79*   HGB 11.6*   HCT 32.8*      MCV 87   MCH 30.6   MCHC 35.4     BMP:   Recent Labs  Lab 07/12/17  0503   *      K 3.6      CO2 24   BUN 6   CREATININE 0.8   CALCIUM 8.5*   MG 1.9     CMP:   Recent Labs  Lab 07/11/17  1402 07/12/17  0503   * 132*   CALCIUM 8.3* 8.5*   ALBUMIN 2.6*  --    PROT 5.7*  --     138   K 3.8 3.6   CO2 25 24    106   BUN 9 6   CREATININE 0.8 0.8   ALKPHOS 62  --    ALT <5*  --    AST 15  --    BILITOT 1.1*  --      LFTs:   Recent Labs  Lab 07/11/17  1402   ALT <5*   AST 15   ALKPHOS 62   BILITOT 1.1*   PROT 5.7*   ALBUMIN 2.6*     Cardiac markers:   Recent Labs  Lab 07/11/17  1402   TROPONINI <0.006         Assessment/Plan:     Hypomagnesemia    replace        Hypophosphatasia    Replace prn        Hypokalemia    replace        Hypertension    On home BP meds  PRN labetalol and hydralazine        * Small bowel obstruction    51 yo woman with SBO.  - s/p ex lap with LUCILLE 7/10/17  - will start chips and sips   - continue IVF  - continue PCA  - nausea meds PRN  - will add valium scheduled for 4 doses for muscle cramps  - PT/OT            Sally Arriaga PA-C   [id number]  General Surgery  [facility]  [place]

## 2017-07-12 NOTE — PLAN OF CARE
Problem: Patient Care Overview  Goal: Plan of Care Review  Care plan reviewed and understood by patient. Resp rate even and unlabored. VSS. Pt with no reports of nausea. Pt ambulated to bathroom. Pt remain free of falls. No acute pain or distress noted. Will continue to monitor.

## 2017-07-12 NOTE — SUBJECTIVE & OBJECTIVE
Interval History:   Patient seen and examined, no acute events overnight, denies N/V, pain well controlled with PCA, but still having muscle cramps, denies further episodes of chest pain/SOB, tele remains in NSR    Medications:  Continuous Infusions:   dextrose 5 % and 0.45 % NaCl with KCl 20 mEq 125 mL/hr at 07/12/17 0505    hydromorphone in 0.9 % NaCl 6 mg/30 ml       Scheduled Meds:   amlodipine  10 mg Oral Daily    cloNIDine 0.1 mg/24 hr td ptwk  1 patch Transdermal Q7 Days    enoxaparin  40 mg Subcutaneous Daily    famotidine (PF)  20 mg Intravenous BID    lisinopril  10 mg Per NG tube Daily    nicotine  1 patch Transdermal Daily    sodium chloride 0.9%  3 mL Intravenous Q8H     PRN Meds:diphenhydrAMINE, hydrALAZINE, labetalol, metoclopramide HCl, morphine, naloxone, ondansetron, ondansetron, promethazine (PHENERGAN) IVPB, sodium chloride 0.9%, sodium chloride 0.9%     Review of patient's allergies indicates:   Allergen Reactions    Penicillins Hives     Objective:     Vital Signs (Most Recent):  Temp: 99.2 °F (37.3 °C) (07/12/17 0609)  Pulse: 72 (07/12/17 0700)  Resp: 17 (07/12/17 0609)  BP: (!) 160/70 (07/12/17 0609)  SpO2: (!) 91 % (07/12/17 0057) Vital Signs (24h Range):  Temp:  [98.4 °F (36.9 °C)-99.6 °F (37.6 °C)] 99.2 °F (37.3 °C)  Pulse:  [71-93] 72  Resp:  [16-24] 17  SpO2:  [90 %-95 %] 91 %  BP: (145-184)/(68-85) 160/70     Weight: 68 kg (150 lb)  Body mass index is 24.96 kg/m².    Intake/Output - Last 3 Shifts       07/10 0700 - 07/11 0659 07/11 0700 - 07/12 0659 07/12 0700 - 07/13 0659    P.O. 0 0     I.V. (mL/kg) 3652 (53.7) 2931.3 (43.1)     IV Piggyback       Total Intake(mL/kg) 3652 (53.7) 2931.3 (43.1)     Urine (mL/kg/hr) 970 (0.6) 670 (0.4)     Emesis/NG output       Drains 350 (0.2)      Other       Stool  0 (0)     Total Output 1320 670      Net +2332 +2261.3             Urine Occurrence  3 x     Stool Occurrence  1 x           Physical Exam   Constitutional: She is oriented to  person, place, and time. She appears well-developed and well-nourished.   HENT:   Head: Normocephalic and atraumatic.   Eyes: No scleral icterus.   Neck: Neck supple.   Cardiovascular: Normal rate and regular rhythm.    Pulmonary/Chest: Effort normal.   Abdominal:   Soft, appropriate TTP, incision with staples - clean, dry and intact   Neurological: She is alert and oriented to person, place, and time.   Skin: Skin is warm and dry.   Psychiatric: She has a normal mood and affect.       Significant Labs:  CBC:   Recent Labs  Lab 07/12/17  0503   WBC 10.30   RBC 3.79*   HGB 11.6*   HCT 32.8*      MCV 87   MCH 30.6   MCHC 35.4     BMP:   Recent Labs  Lab 07/12/17  0503   *      K 3.6      CO2 24   BUN 6   CREATININE 0.8   CALCIUM 8.5*   MG 1.9     CMP:   Recent Labs  Lab 07/11/17  1402 07/12/17  0503   * 132*   CALCIUM 8.3* 8.5*   ALBUMIN 2.6*  --    PROT 5.7*  --     138   K 3.8 3.6   CO2 25 24    106   BUN 9 6   CREATININE 0.8 0.8   ALKPHOS 62  --    ALT <5*  --    AST 15  --    BILITOT 1.1*  --      LFTs:   Recent Labs  Lab 07/11/17  1402   ALT <5*   AST 15   ALKPHOS 62   BILITOT 1.1*   PROT 5.7*   ALBUMIN 2.6*     Cardiac markers:   Recent Labs  Lab 07/11/17  1402   TROPONINI <0.006

## 2017-07-12 NOTE — PROGRESS NOTES
Patient Consulted by CTTS:     The following was discussed by the Tobacco Treatment Specialist:  ? Relevance of Quitting  ? Risk to Health  ? Long Term Risk  ? Risk for Others  ? Rewards of Quitting  ? Motivation Intervention to Quit        [facility] to patient [facility]. Patient is currently using a 14mg NRT -patch.

## 2017-07-12 NOTE — PLAN OF CARE
Problem: Patient Care Overview  Goal: Plan of Care Review  Outcome: Ongoing (interventions implemented as appropriate)  POC reviewed with patient who verbalized understanding. VSS, afebrile. AAOx4. Remain free of falls and injury. ML with telfa, dried drainage marked, no new drainage during shift. NPO, denies nausea. Pain controlled with PCA per MAR. Telemetry being monitored running NSR.  Educated on IS use. Patient denies chest pain & SOB. Patient refuses TEDS & SCDs. No acute events. No distress noted. Bed in lowest position, call light within reach, frequent rounds made for safety. WCTM.

## 2017-07-13 LAB
ANION GAP SERPL CALC-SCNC: 6 MMOL/L
BASOPHILS # BLD AUTO: 0.02 K/UL
BASOPHILS NFR BLD: 0.3 %
BUN SERPL-MCNC: 5 MG/DL
CALCIUM SERPL-MCNC: 8.8 MG/DL
CHLORIDE SERPL-SCNC: 105 MMOL/L
CO2 SERPL-SCNC: 25 MMOL/L
CREAT SERPL-MCNC: 0.7 MG/DL
DIFFERENTIAL METHOD: ABNORMAL
EOSINOPHIL # BLD AUTO: 0.3 K/UL
EOSINOPHIL NFR BLD: 3.6 %
ERYTHROCYTE [DISTWIDTH] IN BLOOD BY AUTOMATED COUNT: 12.8 %
EST. GFR  (AFRICAN AMERICAN): >60 ML/MIN/1.73 M^2
EST. GFR  (NON AFRICAN AMERICAN): >60 ML/MIN/1.73 M^2
GLUCOSE SERPL-MCNC: 137 MG/DL
HCT VFR BLD AUTO: 30.2 %
HGB BLD-MCNC: 10.8 G/DL
LYMPHOCYTES # BLD AUTO: 1.4 K/UL
LYMPHOCYTES NFR BLD: 17.6 %
MAGNESIUM SERPL-MCNC: 1.8 MG/DL
MCH RBC QN AUTO: 30.6 PG
MCHC RBC AUTO-ENTMCNC: 35.8 %
MCV RBC AUTO: 86 FL
MONOCYTES # BLD AUTO: 0.7 K/UL
MONOCYTES NFR BLD: 9.4 %
NEUTROPHILS # BLD AUTO: 5.3 K/UL
NEUTROPHILS NFR BLD: 68.8 %
PHOSPHATE SERPL-MCNC: 3.1 MG/DL
PLATELET # BLD AUTO: 258 K/UL
PMV BLD AUTO: 9.5 FL
POTASSIUM SERPL-SCNC: 3.8 MMOL/L
RBC # BLD AUTO: 3.53 M/UL
SODIUM SERPL-SCNC: 136 MMOL/L
WBC # BLD AUTO: 7.68 K/UL

## 2017-07-13 PROCEDURE — 63600175 PHARM REV CODE 636 W HCPCS: Performed by: PHYSICIAN ASSISTANT

## 2017-07-13 PROCEDURE — G8979 MOBILITY GOAL STATUS: HCPCS | Mod: CH

## 2017-07-13 PROCEDURE — G8978 MOBILITY CURRENT STATUS: HCPCS | Mod: CH

## 2017-07-13 PROCEDURE — 25000003 PHARM REV CODE 250: Performed by: STUDENT IN AN ORGANIZED HEALTH CARE EDUCATION/TRAINING PROGRAM

## 2017-07-13 PROCEDURE — 80048 BASIC METABOLIC PNL TOTAL CA: CPT

## 2017-07-13 PROCEDURE — 63600175 PHARM REV CODE 636 W HCPCS: Performed by: STUDENT IN AN ORGANIZED HEALTH CARE EDUCATION/TRAINING PROGRAM

## 2017-07-13 PROCEDURE — 25000003 PHARM REV CODE 250: Performed by: GENERAL PRACTICE

## 2017-07-13 PROCEDURE — 97161 PT EVAL LOW COMPLEX 20 MIN: CPT

## 2017-07-13 PROCEDURE — 25000003 PHARM REV CODE 250: Performed by: SURGERY

## 2017-07-13 PROCEDURE — 20600001 HC STEP DOWN PRIVATE ROOM

## 2017-07-13 PROCEDURE — 36415 COLL VENOUS BLD VENIPUNCTURE: CPT

## 2017-07-13 PROCEDURE — 63600175 PHARM REV CODE 636 W HCPCS: Performed by: SURGERY

## 2017-07-13 PROCEDURE — G8980 MOBILITY D/C STATUS: HCPCS | Mod: CH

## 2017-07-13 PROCEDURE — 85025 COMPLETE CBC W/AUTO DIFF WBC: CPT

## 2017-07-13 PROCEDURE — 83735 ASSAY OF MAGNESIUM: CPT

## 2017-07-13 PROCEDURE — 84100 ASSAY OF PHOSPHORUS: CPT

## 2017-07-13 RX ORDER — OXYCODONE AND ACETAMINOPHEN 5; 325 MG/1; MG/1
1 TABLET ORAL EVERY 4 HOURS PRN
Status: DISCONTINUED | OUTPATIENT
Start: 2017-07-13 | End: 2017-07-14 | Stop reason: HOSPADM

## 2017-07-13 RX ORDER — POTASSIUM CHLORIDE 7.45 MG/ML
10 INJECTION INTRAVENOUS
Status: COMPLETED | OUTPATIENT
Start: 2017-07-13 | End: 2017-07-13

## 2017-07-13 RX ORDER — MAGNESIUM SULFATE HEPTAHYDRATE 40 MG/ML
2 INJECTION, SOLUTION INTRAVENOUS ONCE
Status: COMPLETED | OUTPATIENT
Start: 2017-07-13 | End: 2017-07-13

## 2017-07-13 RX ORDER — OXYCODONE AND ACETAMINOPHEN 10; 325 MG/1; MG/1
1 TABLET ORAL EVERY 4 HOURS PRN
Status: DISCONTINUED | OUTPATIENT
Start: 2017-07-13 | End: 2017-07-14 | Stop reason: HOSPADM

## 2017-07-13 RX ADMIN — OXYCODONE HYDROCHLORIDE AND ACETAMINOPHEN 1 TABLET: 10; 325 TABLET ORAL at 01:07

## 2017-07-13 RX ADMIN — FAMOTIDINE 20 MG: 10 INJECTION, SOLUTION INTRAVENOUS at 09:07

## 2017-07-13 RX ADMIN — DEXTROSE MONOHYDRATE, SODIUM CHLORIDE, AND POTASSIUM CHLORIDE: 50; 4.5; 1.49 INJECTION, SOLUTION INTRAVENOUS at 10:07

## 2017-07-13 RX ADMIN — POTASSIUM CHLORIDE 10 MEQ: 10 INJECTION, SOLUTION INTRAVENOUS at 10:07

## 2017-07-13 RX ADMIN — OXYCODONE HYDROCHLORIDE AND ACETAMINOPHEN 1 TABLET: 10; 325 TABLET ORAL at 05:07

## 2017-07-13 RX ADMIN — OXYCODONE HYDROCHLORIDE AND ACETAMINOPHEN 1 TABLET: 5; 325 TABLET ORAL at 07:07

## 2017-07-13 RX ADMIN — Medication 3 ML: at 02:07

## 2017-07-13 RX ADMIN — MAGNESIUM SULFATE IN WATER 2 G: 40 INJECTION, SOLUTION INTRAVENOUS at 10:07

## 2017-07-13 RX ADMIN — POTASSIUM CHLORIDE 10 MEQ: 10 INJECTION, SOLUTION INTRAVENOUS at 12:07

## 2017-07-13 RX ADMIN — Medication 3 ML: at 10:07

## 2017-07-13 RX ADMIN — MORPHINE SULFATE 2 MG: 2 INJECTION, SOLUTION INTRAMUSCULAR; INTRAVENOUS at 10:07

## 2017-07-13 RX ADMIN — LISINOPRIL 10 MG: 10 TABLET ORAL at 09:07

## 2017-07-13 RX ADMIN — ENOXAPARIN SODIUM 40 MG: 100 INJECTION SUBCUTANEOUS at 05:07

## 2017-07-13 RX ADMIN — AMLODIPINE BESYLATE 10 MG: 10 TABLET ORAL at 09:07

## 2017-07-13 NOTE — ASSESSMENT & PLAN NOTE
51 yo woman with SBO.  - s/p ex lap with LUCILLE 7/10/17  - advance to clear liquid diet  - decrease IVF  - d/c PCA - PO pain meds  - nausea meds PRN  - PT/OT

## 2017-07-13 NOTE — SUBJECTIVE & OBJECTIVE
Interval History:   Patient seen and examined, no acute events overnight, denies N/V, +F/no BM, otherwise afebrile/VSS    Medications:  Continuous Infusions:   dextrose 5 % and 0.45 % NaCl with KCl 20 mEq 50 mL/hr at 07/13/17 0750     Scheduled Meds:   amlodipine  10 mg Oral Daily    cloNIDine 0.1 mg/24 hr td ptwk  1 patch Transdermal Q7 Days    diazePAM  1 mg Oral Q6H    enoxaparin  40 mg Subcutaneous Daily    famotidine (PF)  20 mg Intravenous BID    lisinopril  10 mg Per NG tube Daily    nicotine  1 patch Transdermal Daily    sodium chloride 0.9%  3 mL Intravenous Q8H     PRN Meds:hydrALAZINE, labetalol, metoclopramide HCl, morphine, ondansetron, ondansetron, oxycodone-acetaminophen, oxycodone-acetaminophen, promethazine (PHENERGAN) IVPB, sodium chloride 0.9%, sodium chloride 0.9%     Review of patient's allergies indicates:   Allergen Reactions    Penicillins Hives     Objective:     Vital Signs (Most Recent):  Temp: 98.8 °F (37.1 °C) (07/13/17 0735)  Pulse: 72 (07/13/17 0735)  Resp: 16 (07/13/17 0735)  BP: (!) 168/80 (07/13/17 0735)  SpO2: 95 % (07/13/17 0735) Vital Signs (24h Range):  Temp:  [98.5 °F (36.9 °C)-99.9 °F (37.7 °C)] 98.8 °F (37.1 °C)  Pulse:  [70-81] 72  Resp:  [16-18] 16  SpO2:  [93 %-96 %] 95 %  BP: (164-179)/(70-82) 168/80     Weight: 68 kg (150 lb)  Body mass index is 24.96 kg/m².    Intake/Output - Last 3 Shifts       07/11 0700 - 07/12 0659 07/12 0700 - 07/13 0659 07/13 0700 - 07/14 0659    P.O. 0 50 100    I.V. (mL/kg) 2931.3 (43.1) 2864.6 (42.1)     IV Piggyback  400     Total Intake(mL/kg) 2931.3 (43.1) 3314.6 (48.7) 100 (1.5)    Urine (mL/kg/hr) 670 (0.4) 1425 (0.9)     Drains       Stool 0 (0)      Total Output 670 1425      Net +2261.3 +1889.6 +100           Urine Occurrence 3 x 1 x     Stool Occurrence 1 x  0 x          Physical Exam   Constitutional: She is oriented to person, place, and time. She appears well-developed and well-nourished.   HENT:   Head: Normocephalic and  atraumatic.   Eyes: No scleral icterus.   Neck: Neck supple.   Cardiovascular: Normal rate and regular rhythm.    Pulmonary/Chest: Effort normal.   Abdominal:   Soft, appropriate TTP, incision with staples - clean, dry and intact   Neurological: She is alert and oriented to person, place, and time.   Skin: Skin is warm and dry.   Psychiatric: She has a normal mood and affect.       Significant Labs:  CBC:   Recent Labs  Lab 07/13/17 0409   WBC 7.68   RBC 3.53*   HGB 10.8*   HCT 30.2*      MCV 86   MCH 30.6   MCHC 35.8     BMP:   Recent Labs  Lab 07/13/17 0409   *      K 3.8      CO2 25   BUN 5*   CREATININE 0.7   CALCIUM 8.8   MG 1.8     CMP:   Recent Labs  Lab 07/11/17  1402  07/13/17  0409   *  < > 137*   CALCIUM 8.3*  < > 8.8   ALBUMIN 2.6*  --   --    PROT 5.7*  --   --      < > 136   K 3.8  < > 3.8   CO2 25  < > 25     < > 105   BUN 9  < > 5*   CREATININE 0.8  < > 0.7   ALKPHOS 62  --   --    ALT <5*  --   --    AST 15  --   --    BILITOT 1.1*  --   --    < > = values in this interval not displayed.  LFTs:   Recent Labs  Lab 07/11/17 1402   ALT <5*   AST 15   ALKPHOS 62   BILITOT 1.1*   PROT 5.7*   ALBUMIN 2.6*     Cardiac markers:   Recent Labs  Lab 07/11/17 1402   TROPONINI <0.006

## 2017-07-13 NOTE — PLAN OF CARE
Problem: Patient Care Overview  Goal: Plan of Care Review  Outcome: Revised  Care plan reviewed and understood by patient. Resp rate even and unlabored. VSS. Pt tolerating diet with no reports of nausea. Dressing change done to abdomen. Pt remain free of falls. No acute pain or distress noted. Will continue to monitor.

## 2017-07-13 NOTE — PROGRESS NOTES
[facility]  General Surgery  Progress Note    Subjective:     Post-Op Info:  Procedure(s) (LRB):  EXPLORATORY-LAPAROTOMY for 10 am (N/A)  LYSIS-ADHESION   3 Days Post-Op     Interval History:   Patient seen and examined, no acute events overnight, denies N/V, +F/no BM, otherwise afebrile/VSS    Medications:  Continuous Infusions:   dextrose 5 % and 0.45 % NaCl with KCl 20 mEq 50 mL/hr at 07/13/17 0750     Scheduled Meds:   amlodipine  10 mg Oral Daily    cloNIDine 0.1 mg/24 hr td ptwk  1 patch Transdermal Q7 Days    diazePAM  1 mg Oral Q6H    enoxaparin  40 mg Subcutaneous Daily    famotidine (PF)  20 mg Intravenous BID    lisinopril  10 mg Per NG tube Daily    nicotine  1 patch Transdermal Daily    sodium chloride 0.9%  3 mL Intravenous Q8H     PRN Meds:hydrALAZINE, labetalol, metoclopramide HCl, morphine, ondansetron, ondansetron, oxycodone-acetaminophen, oxycodone-acetaminophen, promethazine (PHENERGAN) IVPB, sodium chloride 0.9%, sodium chloride 0.9%     Review of patient's allergies indicates:   Allergen Reactions    Penicillins Hives     Objective:     Vital Signs (Most Recent):  Temp: 98.8 °F (37.1 °C) (07/13/17 0735)  Pulse: 72 (07/13/17 0735)  Resp: 16 (07/13/17 0735)  BP: (!) 168/80 (07/13/17 0735)  SpO2: 95 % (07/13/17 0735) Vital Signs (24h Range):  Temp:  [98.5 °F (36.9 °C)-99.9 °F (37.7 °C)] 98.8 °F (37.1 °C)  Pulse:  [70-81] 72  Resp:  [16-18] 16  SpO2:  [93 %-96 %] 95 %  BP: (164-179)/(70-82) 168/80     Weight: 68 kg (150 lb)  Body mass index is 24.96 kg/m².    Intake/Output - Last 3 Shifts       07/11 0700 - 07/12 0659 07/12 0700 - 07/13 0659 07/13 0700 - 07/14 0659    P.O. 0 50 100    I.V. (mL/kg) 2931.3 (43.1) 2864.6 (42.1)     IV Piggyback  400     Total Intake(mL/kg) 2931.3 (43.1) 3314.6 (48.7) 100 (1.5)    Urine (mL/kg/hr) 670 (0.4) 1425 (0.9)     Drains       Stool 0 (0)      Total Output 670 1425      Net +2261.3 +1889.6 +100           Urine Occurrence 3 x 1 x      Stool Occurrence 1 x  0 x          Physical Exam   Constitutional: She is oriented to person, place, and time. She appears well-developed and well-nourished.   HENT:   Head: Normocephalic and atraumatic.   Eyes: No scleral icterus.   Neck: Neck supple.   Cardiovascular: Normal rate and regular rhythm.    Pulmonary/Chest: Effort normal.   Abdominal:   Soft, appropriate TTP, incision with staples - clean, dry and intact   Neurological: She is alert and oriented to person, place, and time.   Skin: Skin is warm and dry.   Psychiatric: She has a normal mood and affect.       Significant Labs:  CBC:   Recent Labs  Lab 07/13/17  0409   WBC 7.68   RBC 3.53*   HGB 10.8*   HCT 30.2*      MCV 86   MCH 30.6   MCHC 35.8     BMP:   Recent Labs  Lab 07/13/17  0409   *      K 3.8      CO2 25   BUN 5*   CREATININE 0.7   CALCIUM 8.8   MG 1.8     CMP:   Recent Labs  Lab 07/11/17  1402  07/13/17  0409   *  < > 137*   CALCIUM 8.3*  < > 8.8   ALBUMIN 2.6*  --   --    PROT 5.7*  --   --      < > 136   K 3.8  < > 3.8   CO2 25  < > 25     < > 105   BUN 9  < > 5*   CREATININE 0.8  < > 0.7   ALKPHOS 62  --   --    ALT <5*  --   --    AST 15  --   --    BILITOT 1.1*  --   --    < > = values in this interval not displayed.  LFTs:   Recent Labs  Lab 07/11/17  1402   ALT <5*   AST 15   ALKPHOS 62   BILITOT 1.1*   PROT 5.7*   ALBUMIN 2.6*     Cardiac markers:   Recent Labs  Lab 07/11/17  1402   TROPONINI <0.006         Assessment/Plan:     Hypomagnesemia    replace        Hypophosphatasia    Replace prn        Hypokalemia    replace        Hypertension    On home BP meds  PRN labetalol and hydralazine        * Small bowel obstruction    51 yo woman with SBO.  - s/p ex lap with LUCILLE 7/10/17  - advance to clear liquid diet  - decrease IVF  - d/c PCA - PO pain meds  - nausea meds PRN  - PT/OT            Sally Arriaga PA-C   [id number]  General Surgery  [facility]

## 2017-07-13 NOTE — PLAN OF CARE
Problem: Physical Therapy Goal  Goal: Physical Therapy Goal  Outcome: Outcome(s) achieved Date Met: 07/13/17  Initial eval completed. Results, POC and d/c planning discussed with patient.

## 2017-07-13 NOTE — PT/OT/SLP EVAL
"Physical Therapy  Evaluation and Discharge    Brisa Chavez   MRN: [id number]   Admitting Diagnosis: Small bowel obstruction s/p ex-lap    PT Received On: 07/13/17  PT Start Time: 1337     PT Stop Time: 1355    PT Total Time (min): 18 min       Billable Minutes:  Evaluation 18    Diagnosis: Small bowel obstruction    Comorbidities and personal factors that affect the PT plan of care or the patient's ability to participation or progress with therapy:  1. CVA but no residual deficits.    Clinical Presentation: stable and/or uncomplicated    Level of Complexity:   Low Complexity  · No personal factors or comorbidities that impact the plan of care  · Examination addressing 1-2 body structures and functions, activity limitations, and/or participation restrictions  · Clinical presentation with stable or uncomplicated characteristics      Past Medical History:   Diagnosis Date    Cancer     Diabetes mellitus     High cholesterol     Hypertension     Stroke with cerebral ischemia     Stroke, thrombotic       Past Surgical History:   Procedure Laterality Date    ABDOMINAL SURGERY         Referring physician: Sally Arriaga PA-C  Date referred to PT: 7/12/2017    General Precautions: Standard,         Patient History:  Living Environment Comment: Pt lives with significant other and multiple family members in a 1 story home with no steps to enter.  She reports being independent prior to admit.  Pt trains her lab-greyhound mix dog as a hobby.  Pt reports hx of CVA with R sided weakness, but states she does not have any residual effects except slurred speach at times.   Equipment Currently Used at Home: none    Subjective:  Communicated with RN prior to session.  "I don't have problems walking around anymore, but I can't walk any faster because I am sore."  Chief Complaint: abdominal pain and soreness consistent with post-op status  Patient goals: none stated    Pain/Comfort  Pain Rating 1: 0/10  Pain Addressed 1: Pre-medicate " for activity  Pain Rating Post-Intervention 1: 0/10      Objective:   Patient found with: peripheral IV   Patient found standing up in room, eating lunch.  She agreed to therapy evaluation.  Full evaluation performed due to hx of CVA with R sided weakness and history of falls.    EXAMINATION    Cognitive Function:  Oriented to: Person, Place, Time and Situation  Level of Alertness: awake and alert  Follows Commands/attention: Follows multistep  commands  Communication: clear/fluent  Safety awareness/insight to disability: intact    Musculoskeletal System  Posture and gross symmetry: Rounded shoulder and Head forward    Lower Extremity Range of Motion:  Right Lower Extremity: WFL  Left Lower Extremity: WFL    Lower Extremity Strength:  Right Lower Extremity: hip 4/5 (pain limited) otherwise WNL  Left Lower Extremity: hip 4/5 (pain limited) otherwise WNL     Integumentary System:  Skin integrity: Visible skin intact    Cardiopulmonary System:  Edema: None noted BLEs  SpO2: WNL    Neuromuscular System:  Sensation:   Light Touch (LT): intact BLEs  Deep Pressure (DP): intact BLEs    Fine motor coordination: Intact  Gross motor coordination: WFL    Balance:   Static Sit: GOOD: Takes MODERATE challenges from all directions;   Dynamic Sit: GOOD: Maintains balance through MODERATE excursions of active trunk movement;   Static Stand: GOOD: Takes MODERATE challenges from all directions; with perturbations   Dynamic stand: GOOD+: Independent gait (with or without assistive device); slight path deviation with L head turns    Postural reactions: anticipatory and reactionary postural reactions appropriate     FUNCTIONAL MOBILITY ASSESSMENT:  Bed Mobility:  NT     Transfers:  Sit to stand transfer: independent  Bed to chair transfer: independent      Gait:   150 feet with supervision and no LOB while performing dynamic gait activities.      Dynamic Gait Index (DGI):    1. Gait level surface (2) Mild Impairment: Walks 20ft, uses  assistive devices, slower speed, mild gait deviations. >slower speed due to reecnt abdominal surgery    2. Change in gait speed: (1) Moderate Impairment: Makes only minor adjustments to walking speed, or accomplishes a changein speed with significant gait deviations, or changes speed but has significant gait deviations, orchanges speed but loses balance but is able to recover and continue walking..    3. Gait with horizontal head turns:(2) Mild Impairment: Performs head turns smoothly with slight change in gait velocity, i.e., minordisruption to smooth gait path or uses walking aid..    4. Gait with vertical head turns (3) Normal: Performs head turns smoothly with no change in gait..    5. Gait and pivot turn:(3) Normal: Pivot turns safely within 3 seconds and stops quickly with no loss of balance..    6. Step over obstacle:(3) Normal: Is able to step over the box without changing gait speed, no evidence of imbalance..    7. Step around obstacles: (3) Normal: Is able to walk around cones safely without changing gait speed; no evidence ofImbalance..    8. Steps:(3) Normal: Alternating feet, no rail..    TOTAL SCORE: _20 / 24 *gait deviations primarily due to soreness from recent surgery     < 19/24 = predictive of falls in the elderly  > 22/24 = safe ambulators    THERAPEUTIC ACTIVITIES AND EXERCISES:  Therapist educated patient on the following:  · Role of PT, POC  · Independent walking program   · D/c planning       AM-PAC 6 CLICK MOBILITY  How much help from another person does this patient currently need?   1 = Unable, Total/Dependent Assistance  2 = A lot, Maximum/Moderate Assistance  3 = A little, Minimum/Contact Guard/Supervision  4 = None, Modified [place]/Independent    Turning over in bed (including adjusting bedclothes, sheets and blankets)?: 4  Sitting down on and standing up from a chair with arms (e.g., wheelchair, bedside commode, etc.): 4  Moving from lying on back to sitting on the side of the  bed?: 4  Moving to and from a bed to a chair (including a wheelchair)?: 4  Need to walk in hospital room?: 4  Climbing 3-5 steps with a railing?: 4  Total Score: 24     AM-PAC Raw Score CMS G-Code Modifier Level of Impairment Assistance   6 % Total / Unable   7 - 9 CM 80 - 100% Maximal Assist   10 - 14 CL 60 - 80% Moderate Assist   15 - 19 CK 40 - 60% Moderate Assist   20 - 22 CJ 20 - 40% Minimal Assist   23 CI 1-20% SBA / CGA   24 CH 0% Independent/ Mod I     Patient left up in chair with all lines intact and call button in reach.    Assessment:   Brisa Chavez is a 52 y.o. female with a medical diagnosis of Small bowel obstruction and presents with no significant functional mobility deficits at this time.  Patient was independent with transfers, standing balance, and gait.  She ambulated 150 feet while performing dynamic activities.  Pt demonstrated mild path deviation with L head turns and slower gait speed, but no other significant gait deviations.  Dynamic balance assessment (DGI) was performed. Patient scored 20/24 indicating that she is not at significant risk for falls. Pt is safe to perform independent ambulation program.  She is not in need of additional skilled PT services at this time.     Rehab identified problem list/impairments: Rehab identified problem list/impairments: impaired skin    Rehab potential is excellent.    Activity tolerance: Excellent    Discharge recommendations: Discharge Facility/Level Of Care Needs: home     Barriers to discharge: Barriers to Discharge: None    Equipment recommendations: Equipment Needed After Discharge: none     PLAN:    D/c from skilled PT services.   Plan of Care expires: 08/13/17  Plan of Care reviewed with: patient          Génesis ALLEN Edenilson, PT  07/13/2017

## 2017-07-14 VITALS
BODY MASS INDEX: 24.99 KG/M2 | SYSTOLIC BLOOD PRESSURE: 148 MMHG | WEIGHT: 150 LBS | HEART RATE: 72 BPM | RESPIRATION RATE: 18 BRPM | HEIGHT: 65 IN | TEMPERATURE: 98 F | DIASTOLIC BLOOD PRESSURE: 77 MMHG | OXYGEN SATURATION: 95 %

## 2017-07-14 PROBLEM — R73.9 HYPERGLYCEMIA: Status: ACTIVE | Noted: 2017-07-14

## 2017-07-14 PROBLEM — E83.39 HYPOPHOSPHATASIA: Status: RESOLVED | Noted: 2017-07-10 | Resolved: 2017-07-14

## 2017-07-14 PROBLEM — D64.9 ANEMIA: Status: ACTIVE | Noted: 2017-07-14

## 2017-07-14 PROBLEM — K56.609 INTESTINAL OBSTRUCTION: Status: RESOLVED | Noted: 2017-07-11 | Resolved: 2017-07-14

## 2017-07-14 PROBLEM — K56.609 SMALL BOWEL OBSTRUCTION: Status: RESOLVED | Noted: 2017-07-07 | Resolved: 2017-07-14

## 2017-07-14 PROBLEM — E83.42 HYPOMAGNESEMIA: Status: RESOLVED | Noted: 2017-07-11 | Resolved: 2017-07-14

## 2017-07-14 PROBLEM — E87.6 HYPOKALEMIA: Status: RESOLVED | Noted: 2017-07-10 | Resolved: 2017-07-14

## 2017-07-14 PROBLEM — R73.9 HYPERGLYCEMIA: Status: RESOLVED | Noted: 2017-07-14 | Resolved: 2017-07-14

## 2017-07-14 LAB
ANION GAP SERPL CALC-SCNC: 8 MMOL/L
BASOPHILS # BLD AUTO: 0.02 K/UL
BASOPHILS NFR BLD: 0.3 %
BUN SERPL-MCNC: 7 MG/DL
CALCIUM SERPL-MCNC: 9 MG/DL
CHLORIDE SERPL-SCNC: 104 MMOL/L
CO2 SERPL-SCNC: 25 MMOL/L
CREAT SERPL-MCNC: 0.7 MG/DL
DIFFERENTIAL METHOD: ABNORMAL
EOSINOPHIL # BLD AUTO: 0.3 K/UL
EOSINOPHIL NFR BLD: 3.8 %
ERYTHROCYTE [DISTWIDTH] IN BLOOD BY AUTOMATED COUNT: 12.8 %
EST. GFR  (AFRICAN AMERICAN): >60 ML/MIN/1.73 M^2
EST. GFR  (NON AFRICAN AMERICAN): >60 ML/MIN/1.73 M^2
GLUCOSE SERPL-MCNC: 120 MG/DL
HCT VFR BLD AUTO: 31.2 %
HGB BLD-MCNC: 10.8 G/DL
LYMPHOCYTES # BLD AUTO: 2 K/UL
LYMPHOCYTES NFR BLD: 27.5 %
MAGNESIUM SERPL-MCNC: 1.8 MG/DL
MCH RBC QN AUTO: 30.3 PG
MCHC RBC AUTO-ENTMCNC: 34.6 %
MCV RBC AUTO: 87 FL
MONOCYTES # BLD AUTO: 0.7 K/UL
MONOCYTES NFR BLD: 10 %
NEUTROPHILS # BLD AUTO: 4.3 K/UL
NEUTROPHILS NFR BLD: 58.1 %
PHOSPHATE SERPL-MCNC: 3.9 MG/DL
PLATELET # BLD AUTO: 343 K/UL
PMV BLD AUTO: 9.9 FL
POTASSIUM SERPL-SCNC: 3.9 MMOL/L
RBC # BLD AUTO: 3.57 M/UL
SODIUM SERPL-SCNC: 137 MMOL/L
WBC # BLD AUTO: 7.38 K/UL

## 2017-07-14 PROCEDURE — 25000003 PHARM REV CODE 250: Performed by: GENERAL PRACTICE

## 2017-07-14 PROCEDURE — 25000003 PHARM REV CODE 250: Performed by: STUDENT IN AN ORGANIZED HEALTH CARE EDUCATION/TRAINING PROGRAM

## 2017-07-14 PROCEDURE — 85025 COMPLETE CBC W/AUTO DIFF WBC: CPT

## 2017-07-14 PROCEDURE — 25000003 PHARM REV CODE 250: Performed by: SURGERY

## 2017-07-14 PROCEDURE — 80048 BASIC METABOLIC PNL TOTAL CA: CPT

## 2017-07-14 PROCEDURE — 84100 ASSAY OF PHOSPHORUS: CPT

## 2017-07-14 PROCEDURE — 36415 COLL VENOUS BLD VENIPUNCTURE: CPT

## 2017-07-14 PROCEDURE — 83735 ASSAY OF MAGNESIUM: CPT

## 2017-07-14 PROCEDURE — 63600175 PHARM REV CODE 636 W HCPCS: Performed by: STUDENT IN AN ORGANIZED HEALTH CARE EDUCATION/TRAINING PROGRAM

## 2017-07-14 PROCEDURE — 63600175 PHARM REV CODE 636 W HCPCS: Performed by: SURGERY

## 2017-07-14 RX ORDER — OXYCODONE AND ACETAMINOPHEN 5; 325 MG/1; MG/1
1 TABLET ORAL EVERY 6 HOURS PRN
Qty: 45 TABLET | Refills: 0 | Status: SHIPPED | OUTPATIENT
Start: 2017-07-14 | End: 2017-07-31

## 2017-07-14 RX ORDER — DOCUSATE SODIUM 100 MG/1
100 CAPSULE, LIQUID FILLED ORAL 2 TIMES DAILY
Refills: 0 | COMMUNITY
Start: 2017-07-14

## 2017-07-14 RX ORDER — DOCUSATE SODIUM 100 MG/1
100 CAPSULE, LIQUID FILLED ORAL 2 TIMES DAILY
Status: DISCONTINUED | OUTPATIENT
Start: 2017-07-14 | End: 2017-07-14 | Stop reason: HOSPADM

## 2017-07-14 RX ORDER — OXYCODONE AND ACETAMINOPHEN 5; 325 MG/1; MG/1
1 TABLET ORAL EVERY 6 HOURS PRN
Qty: 45 TABLET | Refills: 0 | Status: SHIPPED | OUTPATIENT
Start: 2017-07-14 | End: 2017-07-14

## 2017-07-14 RX ORDER — SENNOSIDES 8.6 MG/1
8.6 TABLET ORAL DAILY
Status: DISCONTINUED | OUTPATIENT
Start: 2017-07-14 | End: 2017-07-14 | Stop reason: HOSPADM

## 2017-07-14 RX ADMIN — LISINOPRIL 10 MG: 10 TABLET ORAL at 09:07

## 2017-07-14 RX ADMIN — OXYCODONE HYDROCHLORIDE AND ACETAMINOPHEN 1 TABLET: 10; 325 TABLET ORAL at 12:07

## 2017-07-14 RX ADMIN — OXYCODONE HYDROCHLORIDE AND ACETAMINOPHEN 1 TABLET: 10; 325 TABLET ORAL at 05:07

## 2017-07-14 RX ADMIN — Medication 3 ML: at 06:07

## 2017-07-14 RX ADMIN — FAMOTIDINE 20 MG: 10 INJECTION, SOLUTION INTRAVENOUS at 09:07

## 2017-07-14 RX ADMIN — OXYCODONE HYDROCHLORIDE AND ACETAMINOPHEN 1 TABLET: 10; 325 TABLET ORAL at 02:07

## 2017-07-14 RX ADMIN — ENOXAPARIN SODIUM 40 MG: 100 INJECTION SUBCUTANEOUS at 04:07

## 2017-07-14 RX ADMIN — HYDRALAZINE HYDROCHLORIDE 20 MG: 20 INJECTION INTRAMUSCULAR; INTRAVENOUS at 04:07

## 2017-07-14 RX ADMIN — Medication 3 ML: at 02:07

## 2017-07-14 RX ADMIN — AMLODIPINE BESYLATE 10 MG: 10 TABLET ORAL at 09:07

## 2017-07-14 RX ADMIN — DEXTROSE MONOHYDRATE, SODIUM CHLORIDE, AND POTASSIUM CHLORIDE: 50; 4.5; 1.49 INJECTION, SOLUTION INTRAVENOUS at 05:07

## 2017-07-14 RX ADMIN — DOCUSATE SODIUM 100 MG: 100 CAPSULE, LIQUID FILLED ORAL at 02:07

## 2017-07-14 RX ADMIN — SENNOSIDES 8.6 MG: 8.6 TABLET, FILM COATED ORAL at 02:07

## 2017-07-14 NOTE — PROGRESS NOTES
[facility]  General Surgery  Progress Note    Subjective:     Post-Op Info:  Procedure(s) (LRB):  EXPLORATORY-LAPAROTOMY for 10 am (N/A)  LYSIS-ADHESION   4 Days Post-Op     Interval History:   No acute events overnight. Afebrile. VSS. OOB and ambulating. Adequate UOP. Continues to pass flatus. Tolerating clear liquids. Pain well controlled.      Medications:  Continuous Infusions:   dextrose 5 % and 0.45 % NaCl with KCl 20 mEq 50 mL/hr at 07/14/17 0530     Scheduled Meds:   amlodipine  10 mg Oral Daily    cloNIDine 0.1 mg/24 hr td ptwk  1 patch Transdermal Q7 Days    enoxaparin  40 mg Subcutaneous Daily    famotidine (PF)  20 mg Intravenous BID    lisinopril  10 mg Per NG tube Daily    nicotine  1 patch Transdermal Daily    sodium chloride 0.9%  3 mL Intravenous Q8H     PRN Meds:hydrALAZINE, labetalol, metoclopramide HCl, morphine, ondansetron, ondansetron, oxycodone-acetaminophen, oxycodone-acetaminophen, promethazine (PHENERGAN) IVPB, sodium chloride 0.9%, sodium chloride 0.9%     Review of patient's allergies indicates:   Allergen Reactions    Penicillins Hives     Objective:     Vital Signs (Most Recent):  Temp: 98.3 °F (36.8 °C) (07/14/17 0735)  Pulse: 69 (07/14/17 0735)  Resp: 18 (07/14/17 0735)  BP: (!) 153/77 (07/14/17 0735)  SpO2: 95 % (07/14/17 0735) Vital Signs (24h Range):  Temp:  [97.8 °F (36.6 °C)-99.8 °F (37.7 °C)] 98.3 °F (36.8 °C)  Pulse:  [69-79] 69  Resp:  [16-18] 18  SpO2:  [93 %-97 %] 95 %  BP: (150-177)/(68-81) 153/77     Weight: 68 kg (150 lb)  Body mass index is 24.96 kg/m².    Intake/Output - Last 3 Shifts       07/12 0700 - 07/13 0659 07/13 0700 - 07/14 0659 07/14 0700 - 07/15 0659    P.O. 50 1905     I.V. (mL/kg) 2864.6 (42.1) 1108.3 (16.3)     IV Piggyback 400      Total Intake(mL/kg) 3314.6 (48.7) 3013.3 (44.3)     Urine (mL/kg/hr) 1425 (0.9) 2350 (1.4)     Stool  0 (0)     Total Output 1425 2350      Net +1889.6 +663.3             Urine Occurrence 1 x       Stool Occurrence  0 x           Physical Exam   Constitutional: She is oriented to person, place, and time. She appears well-developed and well-nourished.   HENT:   Head: Normocephalic and atraumatic.   Eyes: No scleral icterus.   Neck: Neck supple.   Cardiovascular: Normal rate and regular rhythm.    Pulmonary/Chest: Effort normal.   Abdominal:   Soft, appropriate TTP, incision with staples - clean, dry and intact   Neurological: She is alert and oriented to person, place, and time.   Skin: Skin is warm and dry.   Psychiatric: She has a normal mood and affect.       Significant Labs:  CBC:   Recent Labs  Lab 07/14/17  0404   WBC 7.38   RBC 3.57*   HGB 10.8*   HCT 31.2*      MCV 87   MCH 30.3   MCHC 34.6     CMP:   Recent Labs  Lab 07/11/17  1402  07/14/17  0404   *  < > 120*   CALCIUM 8.3*  < > 9.0   ALBUMIN 2.6*  --   --    PROT 5.7*  --   --      < > 137   K 3.8  < > 3.9   CO2 25  < > 25     < > 104   BUN 9  < > 7   CREATININE 0.8  < > 0.7   ALKPHOS 62  --   --    ALT <5*  --   --    AST 15  --   --    BILITOT 1.1*  --   --    < > = values in this interval not displayed.    Significant Diagnostics:  None    Assessment/Plan:     Anemia    - Continue to trend        Hyperglycemia    - Accuchecks  - SSI        Hypertension    - Home meds        * Small bowel obstruction    53 yo woman with SBO s/p ex lap with LUCILLE 7/10/17    - Regular diet  - Discontinue mIVF  - DLIV  - Continue current pain control regimen  - Anti-emetics PRN  - Encourage IS, ambulation  - Bowel regimen  - PT/OT            Aydin Wilburn MD  General Surgery  [facility]

## 2017-07-14 NOTE — ASSESSMENT & PLAN NOTE
51 yo woman with SBO s/p ex lap with LUCILLE 7/10/17    - Regular diet  - Discontinue mIVF  - DLIV  - Continue current pain control regimen  - Anti-emetics PRN  - Encourage IS, ambulation  - Bowel regimen  - PT/OT

## 2017-07-14 NOTE — PLAN OF CARE
Problem: Patient Care Overview  Goal: Plan of Care Review  Outcome: Ongoing (interventions implemented as appropriate)  POC reviewed with pt,verbalized understanding. Pt AAOx4, VSS. Pt up ad genevieve. Pt advanced to regular diet, tolerating well,IVFs d/c'd. PO pain medication given PRN for abdominal pain. Midline incisions with staples clean/intact.  Pt remains free of any falls/injury this shift. Call light within reach, will continue to monitor.

## 2017-07-14 NOTE — SUBJECTIVE & OBJECTIVE
Interval History:   No acute events overnight. Afebrile. VSS. OOB and ambulating. Adequate UOP. Continues to pass flatus. Tolerating clear liquids. Pain well controlled.      Medications:  Continuous Infusions:   dextrose 5 % and 0.45 % NaCl with KCl 20 mEq 50 mL/hr at 07/14/17 0530     Scheduled Meds:   amlodipine  10 mg Oral Daily    cloNIDine 0.1 mg/24 hr td ptwk  1 patch Transdermal Q7 Days    enoxaparin  40 mg Subcutaneous Daily    famotidine (PF)  20 mg Intravenous BID    lisinopril  10 mg Per NG tube Daily    nicotine  1 patch Transdermal Daily    sodium chloride 0.9%  3 mL Intravenous Q8H     PRN Meds:hydrALAZINE, labetalol, metoclopramide HCl, morphine, ondansetron, ondansetron, oxycodone-acetaminophen, oxycodone-acetaminophen, promethazine (PHENERGAN) IVPB, sodium chloride 0.9%, sodium chloride 0.9%     Review of patient's allergies indicates:   Allergen Reactions    Penicillins Hives     Objective:     Vital Signs (Most Recent):  Temp: 98.3 °F (36.8 °C) (07/14/17 0735)  Pulse: 69 (07/14/17 0735)  Resp: 18 (07/14/17 0735)  BP: (!) 153/77 (07/14/17 0735)  SpO2: 95 % (07/14/17 0735) Vital Signs (24h Range):  Temp:  [97.8 °F (36.6 °C)-99.8 °F (37.7 °C)] 98.3 °F (36.8 °C)  Pulse:  [69-79] 69  Resp:  [16-18] 18  SpO2:  [93 %-97 %] 95 %  BP: (150-177)/(68-81) 153/77     Weight: 68 kg (150 lb)  Body mass index is 24.96 kg/m².    Intake/Output - Last 3 Shifts       07/12 0700 - 07/13 0659 07/13 0700 - 07/14 0659 07/14 0700 - 07/15 0659    P.O. 50 1905     I.V. (mL/kg) 2864.6 (42.1) 1108.3 (16.3)     IV Piggyback 400      Total Intake(mL/kg) 3314.6 (48.7) 3013.3 (44.3)     Urine (mL/kg/hr) 1425 (0.9) 2350 (1.4)     Stool  0 (0)     Total Output 1425 2350      Net +1889.6 +663.3             Urine Occurrence 1 x      Stool Occurrence  0 x           Physical Exam   Constitutional: She is oriented to person, place, and time. She appears well-developed and well-nourished.   HENT:   Head: Normocephalic and  atraumatic.   Eyes: No scleral icterus.   Neck: Neck supple.   Cardiovascular: Normal rate and regular rhythm.    Pulmonary/Chest: Effort normal.   Abdominal:   Soft, appropriate TTP, incision with staples - clean, dry and intact   Neurological: She is alert and oriented to person, place, and time.   Skin: Skin is warm and dry.   Psychiatric: She has a normal mood and affect.       Significant Labs:  CBC:   Recent Labs  Lab 07/14/17  0404   WBC 7.38   RBC 3.57*   HGB 10.8*   HCT 31.2*      MCV 87   MCH 30.3   MCHC 34.6     CMP:   Recent Labs  Lab 07/11/17  1402  07/14/17  0404   *  < > 120*   CALCIUM 8.3*  < > 9.0   ALBUMIN 2.6*  --   --    PROT 5.7*  --   --      < > 137   K 3.8  < > 3.9   CO2 25  < > 25     < > 104   BUN 9  < > 7   CREATININE 0.8  < > 0.7   ALKPHOS 62  --   --    ALT <5*  --   --    AST 15  --   --    BILITOT 1.1*  --   --    < > = values in this interval not displayed.    Significant Diagnostics:  None

## 2017-07-14 NOTE — PROGRESS NOTES
Discharge instructions given to pt, verbalized understanding. Pt given written prescriptions and follow up appointment information. IV d/c'd. Pt denies any further questions/concerns at this time. Pt awaiting transport.

## 2017-07-14 NOTE — PLAN OF CARE
Problem: Patient Care Overview  Goal: Plan of Care Review  Outcome: Ongoing (interventions implemented as appropriate)  POC reviewed with patient who verbalized understanding. VSS, afebrile. AAOx4. Remain free of falls and injury. ML with telfa. NPO, denies nausea. Pain controlled with PRNmeds per MAR. Telemetry being monitored running NSR.  Educated on IS use. Patient denies chest pain & SOB. Patient refuses TEDS & SCDs. No acute events. No distress noted. Bed in lowest position, call light within reach, frequent rounds made for safety. WCTM.

## 2017-07-16 ENCOUNTER — HOSPITAL ENCOUNTER (EMERGENCY)
Facility: HOSPITAL | Age: 52
Discharge: HOME | End: 2017-07-16
Attending: EMERGENCY MEDICINE | Admitting: EMERGENCY MEDICINE
Payer: MEDICAID

## 2017-07-16 VITALS
TEMPERATURE: 98 F | OXYGEN SATURATION: 100 % | WEIGHT: 160 LBS | HEIGHT: 63 IN | BODY MASS INDEX: 28.35 KG/M2 | HEART RATE: 76 BPM | RESPIRATION RATE: 18 BRPM | DIASTOLIC BLOOD PRESSURE: 77 MMHG | SYSTOLIC BLOOD PRESSURE: 164 MMHG

## 2017-07-16 DIAGNOSIS — T81.43XA POSTPROCEDURAL INTRAABDOMINAL ABSCESS: ICD-10-CM

## 2017-07-16 DIAGNOSIS — R10.9 ABDOMINAL PAIN: Primary | ICD-10-CM

## 2017-07-16 LAB
ALBUMIN SERPL BCP-MCNC: 3.2 G/DL
ALP SERPL-CCNC: 68 U/L
ALT SERPL W/O P-5'-P-CCNC: 18 U/L
ANION GAP SERPL CALC-SCNC: 10 MMOL/L
AST SERPL-CCNC: 27 U/L
BASOPHILS # BLD AUTO: 0.02 K/UL
BASOPHILS NFR BLD: 0.2 %
BILIRUB SERPL-MCNC: 0.7 MG/DL
BILIRUB UR QL STRIP: NEGATIVE
BUN SERPL-MCNC: 10 MG/DL
CALCIUM SERPL-MCNC: 9.7 MG/DL
CHLORIDE SERPL-SCNC: 100 MMOL/L
CLARITY UR REFRACT.AUTO: CLEAR
CO2 SERPL-SCNC: 26 MMOL/L
COLOR UR AUTO: NORMAL
CREAT SERPL-MCNC: 0.9 MG/DL
DIFFERENTIAL METHOD: ABNORMAL
EOSINOPHIL # BLD AUTO: 0.3 K/UL
EOSINOPHIL NFR BLD: 3 %
ERYTHROCYTE [DISTWIDTH] IN BLOOD BY AUTOMATED COUNT: 12.7 %
EST. GFR  (AFRICAN AMERICAN): >60 ML/MIN/1.73 M^2
EST. GFR  (NON AFRICAN AMERICAN): >60 ML/MIN/1.73 M^2
GLUCOSE SERPL-MCNC: 140 MG/DL
GLUCOSE UR QL STRIP: NEGATIVE
HCT VFR BLD AUTO: 33.4 %
HGB BLD-MCNC: 12 G/DL
HGB UR QL STRIP: NEGATIVE
KETONES UR QL STRIP: NEGATIVE
LEUKOCYTE ESTERASE UR QL STRIP: NEGATIVE
LIPASE SERPL-CCNC: 15 U/L
LYMPHOCYTES # BLD AUTO: 1.3 K/UL
LYMPHOCYTES NFR BLD: 15.2 %
MCH RBC QN AUTO: 30.7 PG
MCHC RBC AUTO-ENTMCNC: 35.9 %
MCV RBC AUTO: 85 FL
MONOCYTES # BLD AUTO: 0.7 K/UL
MONOCYTES NFR BLD: 7.6 %
NEUTROPHILS # BLD AUTO: 6.3 K/UL
NEUTROPHILS NFR BLD: 73.8 %
NITRITE UR QL STRIP: NEGATIVE
PH UR STRIP: 6 [PH] (ref 5–8)
PLATELET # BLD AUTO: 427 K/UL
PMV BLD AUTO: 8.9 FL
POTASSIUM SERPL-SCNC: 3.7 MMOL/L
PROT SERPL-MCNC: 7.5 G/DL
PROT UR QL STRIP: NEGATIVE
RBC # BLD AUTO: 3.91 M/UL
SODIUM SERPL-SCNC: 136 MMOL/L
SP GR UR STRIP: 1 (ref 1–1.03)
URN SPEC COLLECT METH UR: NORMAL
UROBILINOGEN UR STRIP-ACNC: NEGATIVE EU/DL
WBC # BLD AUTO: 8.55 K/UL

## 2017-07-16 PROCEDURE — 96374 THER/PROPH/DIAG INJ IV PUSH: CPT

## 2017-07-16 PROCEDURE — 96361 HYDRATE IV INFUSION ADD-ON: CPT

## 2017-07-16 PROCEDURE — 99285 EMERGENCY DEPT VISIT HI MDM: CPT | Mod: 25

## 2017-07-16 PROCEDURE — 85025 COMPLETE CBC W/AUTO DIFF WBC: CPT

## 2017-07-16 PROCEDURE — 63600175 PHARM REV CODE 636 W HCPCS: Performed by: PHYSICIAN ASSISTANT

## 2017-07-16 PROCEDURE — 25500020 PHARM REV CODE 255: Performed by: EMERGENCY MEDICINE

## 2017-07-16 PROCEDURE — 96375 TX/PRO/DX INJ NEW DRUG ADDON: CPT

## 2017-07-16 PROCEDURE — 80053 COMPREHEN METABOLIC PANEL: CPT

## 2017-07-16 PROCEDURE — 83690 ASSAY OF LIPASE: CPT

## 2017-07-16 PROCEDURE — 81003 URINALYSIS AUTO W/O SCOPE: CPT

## 2017-07-16 PROCEDURE — 25000003 PHARM REV CODE 250: Performed by: PHYSICIAN ASSISTANT

## 2017-07-16 PROCEDURE — 99285 EMERGENCY DEPT VISIT HI MDM: CPT | Mod: ,,, | Performed by: EMERGENCY MEDICINE

## 2017-07-16 RX ORDER — METRONIDAZOLE 500 MG/1
500 TABLET ORAL
Status: COMPLETED | OUTPATIENT
Start: 2017-07-16 | End: 2017-07-16

## 2017-07-16 RX ORDER — METRONIDAZOLE 500 MG/1
500 TABLET ORAL 3 TIMES DAILY
Qty: 30 TABLET | Refills: 0 | Status: SHIPPED | OUTPATIENT
Start: 2017-07-16 | End: 2017-07-26

## 2017-07-16 RX ORDER — CIPROFLOXACIN 500 MG/1
500 TABLET ORAL 2 TIMES DAILY
Qty: 20 TABLET | Refills: 0 | Status: SHIPPED | OUTPATIENT
Start: 2017-07-16 | End: 2017-07-26

## 2017-07-16 RX ORDER — HYDROCODONE BITARTRATE AND ACETAMINOPHEN 5; 325 MG/1; MG/1
1 TABLET ORAL EVERY 6 HOURS PRN
Qty: 12 TABLET | Refills: 0 | Status: SHIPPED | OUTPATIENT
Start: 2017-07-16 | End: 2017-07-31

## 2017-07-16 RX ORDER — CIPROFLOXACIN 500 MG/1
500 TABLET ORAL
Status: COMPLETED | OUTPATIENT
Start: 2017-07-16 | End: 2017-07-16

## 2017-07-16 RX ORDER — KETOROLAC TROMETHAMINE 30 MG/ML
10 INJECTION, SOLUTION INTRAMUSCULAR; INTRAVENOUS
Status: COMPLETED | OUTPATIENT
Start: 2017-07-16 | End: 2017-07-16

## 2017-07-16 RX ORDER — ONDANSETRON 2 MG/ML
4 INJECTION INTRAMUSCULAR; INTRAVENOUS
Status: COMPLETED | OUTPATIENT
Start: 2017-07-16 | End: 2017-07-16

## 2017-07-16 RX ADMIN — METRONIDAZOLE 500 MG: 500 TABLET ORAL at 06:07

## 2017-07-16 RX ADMIN — SODIUM CHLORIDE 1000 ML: 0.9 INJECTION, SOLUTION INTRAVENOUS at 12:07

## 2017-07-16 RX ADMIN — ONDANSETRON 4 MG: 2 INJECTION INTRAMUSCULAR; INTRAVENOUS at 12:07

## 2017-07-16 RX ADMIN — IOHEXOL 100 ML: 350 INJECTION, SOLUTION INTRAVENOUS at 03:07

## 2017-07-16 RX ADMIN — CIPROFLOXACIN HYDROCHLORIDE 500 MG: 500 TABLET, FILM COATED ORAL at 06:07

## 2017-07-16 RX ADMIN — KETOROLAC TROMETHAMINE 10 MG: 30 INJECTION, SOLUTION INTRAMUSCULAR; INTRAVENOUS at 12:07

## 2017-07-16 NOTE — ED TRIAGE NOTES
Pt was recently treated for a small bowel obstruction with surgery and discharged on 2 days ago. Pt states she has had left lower quadrant abdominal pain since discharge. Pt reports regular bowel movements with last BM this morning.

## 2017-07-16 NOTE — ED NOTES
Patient identifiers verified and correct for Brisa Chavez.   LOC: The patient is awake, alert and aware of environment with an appropriate affect, the patient is oriented x 3 and speaking appropriately.   APPEARANCE: Patient appears comfortable and in no acute distress, patient is clean and well groomed.  SKIN: The skin is warm and dry, color consistent with ethnicity, patient has normal skin turgor and moist mucus membranes, skin intact, no breakdown or bruising noted.   MUSCULOSKELETAL: Patient moving all extremities spontaneously, no swelling noted.  RESPIRATORY: Airway is open and patent, respirations are spontaneous, patient has a normal effort and rate, no accessory muscle use noted, pt placed on continuous pulse ox with O2 sats noted at 97% on room air.  CARDIAC: Pt placed on cardiac monitor. Patient has a normal rate and regular rhythm, no edema noted, capillary refill < 3 seconds.   GASTRO: Soft and non tender to palpation, no distention noted, normoactive bowel sounds present in all four quadrants. Pt has midline abdominal incision closed with staples. Incision clean, dry and intact. Pt reports LLQ abdominal pain x 2 days.  : Pt denies any pain or frequency with urination.  NEURO: Pt opens eyes spontaneously, behavior appropriate to situation, follows commands, facial expression symmetrical, bilateral hand grasp equal and even, purposeful motor response noted, normal sensation in all extremities when touched with a finger.

## 2017-07-16 NOTE — DISCHARGE INSTRUCTIONS
Instructions    Please take pain medication as needed, if taking narcotics please avoid driving.   Do not lift anything heavier than 10 lb for at least 2 weeks.   Avoid swimming pools, baths or hot tubs for at least 2 weeks.   Please call if fevers, chills, sob, increase drainage from your wound or bleeding or you may go to the ED.     Please call tomorrow and make an appointment this week with Dr. Goldberg.

## 2017-07-16 NOTE — ED PROVIDER NOTES
"Encounter Date: 7/16/2017    SCRIBE #1 NOTE: I, Lynn Álvarez, am scribing for, and in the presence of,  Dr. Chauhan. I have scribed the following portions of the note - the APC attestation. Other sections scribed: imaging .       History     Chief Complaint   Patient presents with    Abdominal Pain     LLQ pain. Recent admission and surgery for SBO.      This is a 52 year old female with a PMH of HTN, DM, HLP, stroke who presents to the ED with a chief complaint of abdominal pain. Patient is s/p ex lap with LUCILLE on 7/10/17 with a diagnosis of SBO. She reports that she was unable to obtain her pain medication post operatively following discharge (her sister filled the prescription but went to [place] for a wedding and has not returned). She reports intermittent sharp 10/10 pain in the LLQ over the last several days. Associated symptoms include nausea/vomiting and constipation. This morning she had a very small formed BM "like an olive" with occasional flatulence. She is tolerating a clear liquid diet. Denies fever, chills, chest pain, SOB, hematemesis, dysuria. Prior surgical hx of total hysterectomy.          Review of patient's allergies indicates:   Allergen Reactions    Penicillins Hives     Past Medical History:   Diagnosis Date    Cancer     Diabetes mellitus     High cholesterol     Hypertension     Stroke with cerebral ischemia     Stroke, thrombotic      Past Surgical History:   Procedure Laterality Date    ABDOMINAL SURGERY       Family History   Problem Relation Age of Onset    Hypertension Father     Diabetes Maternal Aunt      Social History   Substance Use Topics    Smoking status: Current Every Day Smoker     Packs/day: 0.50     Types: Cigarettes    Smokeless tobacco: Never Used    Alcohol use Yes     Review of Systems   Constitutional: Negative for chills and fever.   HENT: Negative for sore throat.    Respiratory: Negative for shortness of breath.    Cardiovascular: Negative for chest " pain.   Gastrointestinal: Positive for abdominal distention, abdominal pain, constipation, nausea and vomiting.   Genitourinary: Negative for dysuria.   Musculoskeletal: Negative for back pain.   Skin: Negative for rash.   Neurological: Negative for weakness.   Hematological: Does not bruise/bleed easily.       Physical Exam     Initial Vitals [07/16/17 1055]   BP Pulse Resp Temp SpO2   (!) 160/70 77 16 98.3 °F (36.8 °C) 98 %      MAP       100         Physical Exam    Constitutional: She appears well-developed and well-nourished. No distress.   Crying, tearful.   HENT:   Head: Atraumatic.   Eyes: Conjunctivae and EOM are normal. Pupils are equal, round, and reactive to light.   Cardiovascular: Normal rate, regular rhythm and normal heart sounds.   Pulmonary/Chest: Breath sounds normal. No respiratory distress. She has no wheezes. She has no rhonchi. She has no rales.   Abdominal: Soft. Bowel sounds are normal. She exhibits distension. There is tenderness. There is no rigidity, no rebound and no guarding.   Midline surgical incision with staples, wound margins are clean, dry, and intact.   Neurological: She is alert and oriented to person, place, and time.   Skin: Skin is warm and dry. No rash noted.         ED Course   Procedures  Labs Reviewed   CBC W/ AUTO DIFFERENTIAL - Abnormal; Notable for the following:        Result Value    RBC 3.91 (*)     Hematocrit 33.4 (*)     Platelets 427 (*)     MPV 8.9 (*)     Gran% 73.8 (*)     Lymph% 15.2 (*)     All other components within normal limits   COMPREHENSIVE METABOLIC PANEL - Abnormal; Notable for the following:     Glucose 140 (*)     Albumin 3.2 (*)     All other components within normal limits   LIPASE   URINALYSIS, REFLEX TO URINE CULTURE         Imaging Results          CT Abdomen Pelvis With Contrast (Final result)  Result time 07/16/17 16:07:52    Final result by Karina Khan MD (07/16/17 16:07:52)                 Impression:        Two separate fluid  collections within the anterior mid abdomen and anterior pelvis, with the larger in the pelvis roughly measuring 10.5 x 5 cm demonstrating wall enhancement concerning for abscess. No evidence of bowel obstruction.    Greater than expected for age coronary artery and aortic atherosclerosis.    Findings suggestive of hepatic steatosis.    Hysterectomy.    Left Bartholin gland cyst.  ______________________________________     Electronically signed by resident: TONO BALDWIN MD  Date:     07/16/17  Time:    16:00            As the supervising and teaching physician, I personally reviewed the images and resident's interpretation and I agree with the findings.          Electronically signed by: CLAUDIA MACE MD  Date:     07/16/17  Time:    16:07              Narrative:    Procedure comments: The patient was surveyed from the lung bases through the pelvis after the administration of Omni 350 IV contrast as well as oral contrast and data was reconstructed for coronal, sagittal, and axial images.    Comparison: CT abdomen pelvis 7/7/2017.    Findings:    The lung bases are clear.  No pleural effusion is seen.      The visualized portions of the heart appear normal. There is coronary artery atherosclerosis. No pericardial fluid.    The liver is normal in size and diffusely hypoattenuating in comparison to the spleen with fatty infiltration at the falciform ligament. The spleen is unremarkable.  The gallbladder is unremarkable.  No intrahepatic or extrahepatic biliary ductal dilatation is identified.    The stomach, pancreas, and adrenal glands are unremarkable.    The kidneys are normal in size and location. They concentrate and excrete contrast appropriately.  No hydronephrosis is seen.  The visualized ureters are unremarkable. The urinary bladder is unremarkable. The uterus is surgically removed.    There are postoperative changes with a midline incision for prior small bowel obstruction repair. There is a small  collection in the anterior mid abdomen mesentery just below the incision measuring 3.9 x 1 cm on sagittal view. There is a larger collection noted from the inferior portion of the incision site extending downward into the pelvis into a somewhat partial focal collection with an enhancing wall measuring 10.5 x 5 cm. No definite connection is noted between the 2 collections. There are small punctate foci of air within the abdomen. The visualized loops of small and large bowel show no evidence of obstruction or inflammation.    There is no evidence of lymph node enlargement in the abdomen or pelvis.    The abdominal aorta is normal in course and caliber significant atherosclerotic calcifications.    The osseous structures demonstrate degenerative changes at the L5-S1.                             X-Ray Abdomen Flat And Erect (Final result)  Result time 07/16/17 12:11:52    Final result by Amanuel Causey MD (07/16/17 12:11:52)                 Impression:      Abnormal intestinal gas pattern with moderate small bowel dilatation with multiple air-fluid levels present. Findings are suggestive of possible small bowel obstruction.  Surgical changes.      Electronically signed by: AMANUEL CAUSEY MD  Date:     07/16/17  Time:    12:11              Narrative:    Comparison is made to prior examination dated 7/11/17.    Flat and erect radiographs of the abdomen were obtained. There are mildly dilated air-filled loops of small bowel present with multiple air-fluid levels identified on the upright radiograph. There is also air identified within a normal caliber proximal colon. Mechanical small bowel obstruction should be considered. There are surgical changes present with skin staples projecting over the lower abdomen and pelvis in the midline. Multiple surgical clips project over the lower abdomen to the right of midline. Bony structures are grossly intact.                                X-Rays:   Independently Interpreted  "Readings:   Abdomen:   Flat and Erect of Abdomen - Scattered air fluid levels to bilateral upper abdomen zones and right mid-abdomen.      Medical Decision Making:   History:   Old Medical Records: I decided to obtain old medical records.  Independently Interpreted Test(s):   I have ordered and independently interpreted X-rays - see prior notes.  Clinical Tests:   Lab Tests: Ordered and Reviewed  Radiological Study: Ordered and Reviewed  Other:   I have discussed this case with another health care provider.       APC / Resident Notes:   52 year old female presents with abdominal pain and vomiting s/p ex lap 7/10/17.  On exam she is afebrile and nontoxic. She appears uncomfortable. There is mild abdominal distension and generalized tenderness. No rebound, guarding, or rigidity. Midline incision CDI.    DDX includes but is not limited to SBO, diverticulitis, post op pain, UTI, diverticulitis, renal colic, lumbar radiculopathy.    Labs demonstrate no acute significant findings - noting no leukocytosis and stable H&H 12/33.  Plain films of the abdomen demonstrate small bowel dilation with air fluid levels suggestive of obstruction. I discussed this case with surgery who recommend CT abd with oral and IV contrast. Will proceed as recommended.     Appreciate consult.  CT abdomen demonstrates "two separate fluid collections within the anterior mid abdomen and anterior pelvis, with the larger in the pelvis roughly measuring 10.5 x 5 cm demonstrating wall enhancement concerning for abscess. No evidence of bowel obstruction."    5:50 PM Per general surgery patient stable for discharge with Cipro/Flagyl. She is tolerating oral intake. Pain is control. Norco (#12 tabs given) prn pain. Outpatient f/u with Gen Surg in 2-3 days. Return to ED precautions given. I discussed the care of this patient with my supervising MD.          Attending Attestation:   Physician Attestation Statement for Resident:  As the supervising MD "   Physician Attestation Statement: I have personally seen and examined this patient.   I agree with the above history. -: 52 y.o. female with recent abdominal surgery presents for evaluation of abdominal pain and distention.   As the supervising MD I agree with the above PE.    As the supervising MD I agree with the above treatment, course, plan, and disposition.                    ED Course     Clinical Impression:   The primary encounter diagnosis was Abdominal pain. A diagnosis of Postprocedural intraabdominal abscess was also pertinent to this visit.    Disposition:   Disposition: Discharged  Condition: Stable                        JANES Morris  07/16/17 [id number]

## 2017-07-16 NOTE — DISCHARGE SUMMARY
[facility]  General Surgery  Discharge Summary      Patient Name: Brisa Chavez  MRN: [id number]  Admission Date: 7/7/2017  Hospital Length of Stay: 7 days  Discharge Date and Time: 7/14/2017  6:18 PM  Attending Physician: Dr. Goldberg  Discharging Provider: Edmar Miller MD  Primary Care Provider: Argelia Ahuja MD     HPI: The patient Brisa Chavez is a 52 y.o. female with history of HTN, HLD, endometriosis with ROSEY last year at an outside facility presents with approx 18 hour history of abdominal pain and non-bilious, non-bloody nausea and vomiting.  She reports the abdominal pain is a diffuse ache in her midabdomen that is non-radiating.  She states her last bowel movement was yesterday and she also has not passed flatus since that time.  She denies prior similar episodes.    Procedure(s) (LRB):  EXPLORATORY-LAPAROTOMY for 10 am (N/A)  LYSIS-ADHESION     Hospital Course: The patient was admitted from the ER and underwent initial conservative management. She was kept NPO, NGT to LWIS and serial abdominal exams. Her condition did not improve after 3 days of conservative management and so she was taken to the OR for an ex-lap with lysis of adhesions. Please see the op note for further details. Post-operatively, she did well. Her NGT was removed on POD# 1. She was advanced to a diet on POD#2. She had return of bowel function on that day as well. Her diet was advanced to regular and her pain was reasonably controlled on oral pain meds. She was ambulating frequently, tolerating a diet, had return of bowel function and her pain was controlled. She met goals for discharge on POD#4.    Consults:   Consults         Status Ordering Provider     Inpatient consult to General surgery  Once     Provider:  (Not yet assigned)    Completed KAYLEEN SALCEDO          Significant Diagnostic Studies: Radiology: CT scan: CT ABDOMEN PELVIS WITH CONTRAST:   Results for orders placed or performed during the  hospital encounter of 07/07/17   CT Abdomen Pelvis With Contrast    Narrative    TECHNIQUE: Axial images of the abdomen and pelvis were acquired  after the use of 75 cc [id number] IV contrast.  Coronal and sagittal reconstructions were also obtained    COMPARISON: None.     FINDINGS:   The lung bases are free of pleural fluid or focal consolidation.  Visualized portions of the heart and pericardium are unremarkable.    The liver is slightly prominent in size measuring 17 cm in craniocaudad dimension.  There is diffusely decreased attenuation of the hepatic parenchyma compared to the spleen which can be seen with fatty infiltration.  There is a more focal region of hypoattenuation along the ligamentum teres, likely reflecting a focal region of fatty infiltration.  The spleen, pancreas, and adrenal glands are unremarkable.  The gallbladder demonstrates no evidence of radiopaque stones.  There is no intra-or extrahepatic biliary ductal dilatation..    The kidneys are normal in size and location and concentrate and excrete contrast satisfactorily on delayed phase imaging.  The urinary bladder is unremarkable.  The uterus is surgically absent.  Adnexal regions are within normal limits.  There is a low attenuating well circumscribed 2.4 cm hypodensity below the level of the pubic symphysis likely reflecting left Bartholin's gland cyst.    There are multiple fluid-filled, dilated loops of small bowel within the left abdomen measuring up to 3.5 cm in diameter.  There is an apparent transition point and the midabdomen (axial series 2 image 45, series 4 image 16) with decompressed loops of distal small bowel.  Findings concerning for small bowel obstruction.  There is no pneumatosis or intraperitoneal free air.  There is no ascites.  The appendix is unremarkable.  The visualized loops of large bowel demonstrate no evidence of obstruction or inflammatory change.  There is postoperative change of the anterior abdominal  wall.    The abdominal aorta is non-aneurysmal.  There is calcified and noncalcified atheromatous plaque within the aortic lumen.  There is a hemostasis clip anterior to the RIGHT psoas muscle at the level of the RIGHT common iliac bifurcation possibly related to prior lymph node dissection.      Osseous structures demonstrate age-appropriate degenerative changes prominent at L5-S1 with disc space height loss and vacuum disc phenomenon.  No acute fracture.    Impression       1.  Multiple dilated loops of small bowel within the left abdomen with transition point in the mid abdomen concerning for small bowel obstruction.    2.  Status post hysterectomy.  Left Bartholin's gland cyst.    3.  Findings suggestive of hepatic steatosis with focal region of fatty infiltration along the ligamentum teres.    4.  Additional findings as above.    ______________________________________     Electronically signed by resident: OSMANY BRASWELL MD  Date:     07/07/17  Time:    05:37            As the supervising and teaching physician, I personally reviewed the images and resident's interpretation and I agree with the findings.          Electronically signed by: MANOJ SHAFFER  Date:     07/07/17  Time:    06:04        Pending Diagnostic Studies:     None        Final Active Diagnoses:    Diagnosis Date Noted POA    PRINCIPAL PROBLEM:  Small bowel obstruction [K56.69] 07/07/2017 Yes    Anemia [D64.9] 07/14/2017 Yes    Hypertension [I10] 07/10/2017 Yes      Problems Resolved During this Admission:    Diagnosis Date Noted Date Resolved POA    Hyperglycemia [R73.9] 07/14/2017 07/14/2017 Yes    Intestinal obstruction [K56.60] 07/11/2017 07/14/2017 Yes    Hypomagnesemia [E83.42] 07/11/2017 07/14/2017 Yes    Hypokalemia [E87.6] 07/10/2017 07/14/2017 Yes    Hypophosphatasia [E83.39] 07/10/2017 07/14/2017 Yes    Intestinal obstruction [K56.60] 07/07/2017 07/10/2017 Yes      Discharged Condition: good    Disposition: Home or Self  Care    Follow Up:  Follow-up Information     Joshua Goldberg, MD In 2 weeks.    Specialty:  General Surgery  Why:  Wound check/Office to schedule & will call you.   Contact information:  [address]  [facility] [address] [phone]                 Patient Instructions:     Diet general     Activity as tolerated     Shower on day dressing removed (No bath)     Lifting restrictions   Order Comments: No lifting weights greater than 10 pounds for 6 weeks after surgery.     Weight bearing restrictions (specify)   Order Comments: No carrying weights greater than 10 pounds for 6 weeks after surgery.     Call MD for:  temperature >100.4     Call MD for:  persistent nausea and vomiting or diarrhea     Call MD for:  severe uncontrolled pain     Call MD for:  redness, tenderness, or signs of infection (pain, swelling, redness, odor or green/yellow discharge around incision site)     Call MD for:  worsening rash     Call MD for:  persistent dizziness, light-headedness, or visual disturbances     Call MD for:  difficulty breathing or increased cough     No dressing needed       Medications:  Reconciled Home Medications:   Discharge Medication List as of 7/14/2017  5:49 PM      START taking these medications    Details   docusate sodium (COLACE) 100 MG capsule Take 1 capsule (100 mg total) by mouth 2 (two) times daily., Starting Fri 7/14/2017, OTC         CONTINUE these medications which have CHANGED    Details   oxycodone-acetaminophen (PERCOCET) 5-325 mg per tablet Take 1 tablet by mouth every 6 (six) hours as needed for Pain. No alcohol, no driving, no operating machinery, no working, no swimming, no additional Tylenol (acetaminophen) while taking this medication., Starting Fri 7/14/2017, Print         CONTINUE these medications which have NOT CHANGED    Details   amlodipine (NORVASC) 10 MG tablet Take 10 mg by mouth once daily., Historical Med      atorvastatin (LIPITOR) 10 MG tablet Take 10 mg by mouth once  daily., Until Discontinued, Historical Med      benazepril-hydrochlorthiazide (LOTENSIN HCT) 20-25 mg Tab Take 1 tablet by mouth once daily., Starting 11/22/2013, Until Sat 11/22/14, Normal      lisinopril 10 MG tablet Take 10 mg by mouth once daily., Until Discontinued, Historical Med         STOP taking these medications       aspirin (ECOTRIN) 325 MG EC tablet Comments:   Reason for Stopping:               Edmar Miller MD  General Surgery  [facility]

## 2017-07-17 NOTE — CONSULTS
[facility]  General Surgery  Consult Note    Patient Name: Brisa Chavez  MRN: [id number]  Code Status: Prior  Admission Date: 7/16/2017  Hospital Length of Stay: 0 days  Attending Physician: No att. providers found  Primary Care Provider: Argelia Ahuja MD    Patient information was obtained from patient and ER records.     Consults  Subjective:     Principal Problem: Intraabdominal abscess    History of Present Illness: Ms Chavez is a 53 yo F with PMH of an Open hysterectomy for Endometriosis (and per patient possible cancer?) who presented two weeks ago with a SBO requiring Ex lap and LUCILLE by Dr. Goldberg on 7/10/2017 who was discharged last Friday who presents today to the ED with nausea, emesis and crampy abdominal pain since yesterday evening.     Patient states she had dinner and after an hour started feeling nausea and had x2 emesis, she describes lower crampy abdominal pain as well as diarrhea. She had another episode of emesis this morning after breakfast and persistent abdominal cramping. She has been passing gas and last BM was in the ED before going for a CT abdomen/Pelvis. She has had some chills, but no fever, denies any chest pain or sob.     She had a port a cath placed about 1 year ago in [facility], states it was for possible chemo but has never been used since and would like to have it removed.     No current facility-administered medications on file prior to encounter.      Current Outpatient Prescriptions on File Prior to Encounter   Medication Sig    lisinopril 10 MG tablet Take 10 mg by mouth once daily.    amlodipine (NORVASC) 10 MG tablet Take 10 mg by mouth once daily.    atorvastatin (LIPITOR) 10 MG tablet Take 10 mg by mouth once daily.    benazepril-hydrochlorthiazide (LOTENSIN HCT) 20-25 mg Tab Take 1 tablet by mouth once daily.    docusate sodium (COLACE) 100 MG capsule Take 1 capsule (100 mg total) by mouth 2 (two) times daily.    oxycodone-acetaminophen  (PERCOCET) 5-325 mg per tablet Take 1 tablet by mouth every 6 (six) hours as needed for Pain. No alcohol, no driving, no operating machinery, no working, no swimming, no additional Tylenol (acetaminophen) while taking this medication.       Review of patient's allergies indicates:   Allergen Reactions    Penicillins Hives       Past Medical History:   Diagnosis Date    Cancer     Diabetes mellitus     High cholesterol     Hypertension     Stroke with cerebral ischemia     Stroke, thrombotic      Past Surgical History:   Procedure Laterality Date    ABDOMINAL SURGERY       Family History     Problem Relation (Age of Onset)    Diabetes Maternal Aunt    Hypertension Father        Social History Main Topics    Smoking status: Current Every Day Smoker     Packs/day: 0.50     Types: Cigarettes    Smokeless tobacco: Never Used    Alcohol use Yes    Drug use: No    Sexual activity: Not on file     Review of Systems   Constitutional: Positive for appetite change and chills. Negative for activity change, diaphoresis, fatigue, fever and unexpected weight change.   Respiratory: Negative for cough, chest tightness, shortness of breath and stridor.    Cardiovascular: Negative for chest pain, palpitations and leg swelling.   Gastrointestinal: Positive for abdominal pain, diarrhea, nausea and vomiting. Negative for abdominal distention, blood in stool and constipation.   Endocrine: Negative for cold intolerance.   Genitourinary: Negative for difficulty urinating and dysuria.   Musculoskeletal: Negative for arthralgias.     Objective:     Vital Signs (Most Recent):  Temp: 98.3 °F (36.8 °C) (07/16/17 1055)  Pulse: 76 (07/16/17 1732)  Resp: 18 (07/16/17 1107)  BP: (!) 164/77 (07/16/17 1732)  SpO2: 100 % (07/16/17 1732) Vital Signs (24h Range):  Temp:  [98.3 °F (36.8 °C)] 98.3 °F (36.8 °C)  Pulse:  [68-77] 76  Resp:  [16-18] 18  SpO2:  [96 %-100 %] 100 %  BP: (145-164)/(69-77) 164/77     Weight: 72.6 kg (160 lb)  Body mass  index is 28.34 kg/m².    Physical Exam     General: In no acute distress, well appearance.   CV: RRR, S1, S2 no murmur or gallops   Pulm: non labored breathing, b/l clear breath sounds.   Abd: soft, non distended, lower abdominal tenderness. No rebound no guarding. Incision with staples c/d/i with some surrounding erythema but no purulent drainage or fluctuance.   Ext: wwp      Significant Labs:  CBC:   Recent Labs  Lab 07/16/17  1122   WBC 8.55   RBC 3.91*   HGB 12.0   HCT 33.4*   *   MCV 85   MCH 30.7   MCHC 35.9     CMP:   Recent Labs  Lab 07/16/17  1122   *   CALCIUM 9.7   ALBUMIN 3.2*   PROT 7.5      K 3.7   CO2 26      BUN 10   CREATININE 0.9   ALKPHOS 68   ALT 18   AST 27   BILITOT 0.7       Significant Diagnostics:      CT abd/Pelvis    Two separate fluid collections within the anterior mid abdomen and anterior pelvis, with the larger in the pelvis roughly measuring 10.5 x 5 cm demonstrating wall enhancement concerning for abscess. No evidence of bowel obstruction.    Greater than expected for age coronary artery and aortic atherosclerosis.    Findings suggestive of hepatic steatosis.    Hysterectomy.    Left Bartholin gland cyst.    Assessment/Plan:     Postprocedural intraabdominal abscess    Ms Chavez is a 51 yo F with PMH of an Open hysterectomy for Endometriosis (and per patient possible cancer?) who presented two weeks ago with a SBO requiring Ex lap and LUCILLE by Dr. Goldberg on 7/10/2017 who presents with intraabdominal collections seen in CT scan.     - Patient not septic, with no leukocytosis and benign abdominal exam, after discussion with her and IR she wanted to go home and proceed with IR drainage of fluid collections as an outpatient, likely this Tuesday. She will take Ciprofloxacin and Flagyl PO until then and will follow up with Dr. Goldberg this week.   - She was given a PO challenge in the ED and was able to tolerate a sandwich.   - Sister lost Oxycodone that was  prescribed to her for discharge, ED can prescribe some PO medication for next week.   - Advised to come back to ED or call surgery if any worsening abdominal pain, fevers, chills or nausea and emesis.   - Discussed with Dr. Arellano, will notify Dr. Goldberg tomorrow.     Perla Farah MD  General Surgery PGY IV  Beeper: [phone]

## 2017-07-17 NOTE — SUBJECTIVE & OBJECTIVE
No current facility-administered medications on file prior to encounter.      Current Outpatient Prescriptions on File Prior to Encounter   Medication Sig    lisinopril 10 MG tablet Take 10 mg by mouth once daily.    amlodipine (NORVASC) 10 MG tablet Take 10 mg by mouth once daily.    atorvastatin (LIPITOR) 10 MG tablet Take 10 mg by mouth once daily.    benazepril-hydrochlorthiazide (LOTENSIN HCT) 20-25 mg Tab Take 1 tablet by mouth once daily.    docusate sodium (COLACE) 100 MG capsule Take 1 capsule (100 mg total) by mouth 2 (two) times daily.    oxycodone-acetaminophen (PERCOCET) 5-325 mg per tablet Take 1 tablet by mouth every 6 (six) hours as needed for Pain. No alcohol, no driving, no operating machinery, no working, no swimming, no additional Tylenol (acetaminophen) while taking this medication.       Review of patient's allergies indicates:   Allergen Reactions    Penicillins Hives       Past Medical History:   Diagnosis Date    Cancer     Diabetes mellitus     High cholesterol     Hypertension     Stroke with cerebral ischemia     Stroke, thrombotic      Past Surgical History:   Procedure Laterality Date    ABDOMINAL SURGERY       Family History     Problem Relation (Age of Onset)    Diabetes Maternal Aunt    Hypertension Father        Social History Main Topics    Smoking status: Current Every Day Smoker     Packs/day: 0.50     Types: Cigarettes    Smokeless tobacco: Never Used    Alcohol use Yes    Drug use: No    Sexual activity: Not on file     Review of Systems   Constitutional: Positive for appetite change and chills. Negative for activity change, diaphoresis, fatigue, fever and unexpected weight change.   Respiratory: Negative for cough, chest tightness, shortness of breath and stridor.    Cardiovascular: Negative for chest pain, palpitations and leg swelling.   Gastrointestinal: Positive for abdominal pain, diarrhea, nausea and vomiting. Negative for abdominal distention, blood  in stool and constipation.   Endocrine: Negative for cold intolerance.   Genitourinary: Negative for difficulty urinating and dysuria.   Musculoskeletal: Negative for arthralgias.     Objective:     Vital Signs (Most Recent):  Temp: 98.3 °F (36.8 °C) (07/16/17 1055)  Pulse: 76 (07/16/17 1732)  Resp: 18 (07/16/17 1107)  BP: (!) 164/77 (07/16/17 1732)  SpO2: 100 % (07/16/17 1732) Vital Signs (24h Range):  Temp:  [98.3 °F (36.8 °C)] 98.3 °F (36.8 °C)  Pulse:  [68-77] 76  Resp:  [16-18] 18  SpO2:  [96 %-100 %] 100 %  BP: (145-164)/(69-77) 164/77     Weight: 72.6 kg (160 lb)  Body mass index is 28.34 kg/m².    Physical Exam     General: In no acute distress, well appearance.   CV: RRR, S1, S2 no murmur or gallops   Pulm: non labored breathing, b/l clear breath sounds.   Abd: soft, non distended, lower abdominal tenderness. No rebound no guarding. Incision with staples c/d/i with some surrounding erythema but no purulent drainage or fluctuance.   Ext: wwp      Significant Labs:  CBC:   Recent Labs  Lab 07/16/17  1122   WBC 8.55   RBC 3.91*   HGB 12.0   HCT 33.4*   *   MCV 85   MCH 30.7   MCHC 35.9     CMP:   Recent Labs  Lab 07/16/17  1122   *   CALCIUM 9.7   ALBUMIN 3.2*   PROT 7.5      K 3.7   CO2 26      BUN 10   CREATININE 0.9   ALKPHOS 68   ALT 18   AST 27   BILITOT 0.7       Significant Diagnostics:      CT abd/Pelvis    Two separate fluid collections within the anterior mid abdomen and anterior pelvis, with the larger in the pelvis roughly measuring 10.5 x 5 cm demonstrating wall enhancement concerning for abscess. No evidence of bowel obstruction.    Greater than expected for age coronary artery and aortic atherosclerosis.    Findings suggestive of hepatic steatosis.    Hysterectomy.    Left Bartholin gland cyst.

## 2017-07-17 NOTE — HPI
Ms Chavez is a 51 yo F with PMH of an Open hysterectomy for Endometriosis (and per patient possible cancer?) who presented two weeks ago with a SBO requiring Ex lap and LUCILLE by Dr. Goldberg on 7/10/2017 who was discharged last Friday who presents today to the ED with nausea, emesis and crampy abdominal pain since yesterday evening.     Patient states she had dinner and after an hour started feeling nausea and had x2 emesis, she describes lower crampy abdominal pain as well as diarrhea. She had another episode of emesis this morning after breakfast and persistent abdominal cramping. She has been passing gas and last BM was in the ED before going for a CT abdomen/Pelvis. She has had some chills, but no fever, denies any chest pain or sob.     She had a port a cath placed about 1 year ago in [facility], states it was for possible chemo but has never been used since and would like to have it removed.

## 2017-07-31 ENCOUNTER — HOSPITAL ENCOUNTER (EMERGENCY)
Facility: HOSPITAL | Age: 52
Discharge: HOME | End: 2017-07-31
Attending: EMERGENCY MEDICINE | Admitting: EMERGENCY MEDICINE
Payer: MEDICAID

## 2017-07-31 VITALS
RESPIRATION RATE: 18 BRPM | WEIGHT: 150 LBS | BODY MASS INDEX: 24.99 KG/M2 | OXYGEN SATURATION: 99 % | SYSTOLIC BLOOD PRESSURE: 170 MMHG | HEIGHT: 65 IN | HEART RATE: 77 BPM | TEMPERATURE: 99 F | DIASTOLIC BLOOD PRESSURE: 77 MMHG

## 2017-07-31 DIAGNOSIS — F17.200 SMOKER: ICD-10-CM

## 2017-07-31 DIAGNOSIS — Z87.898 NO POST-OP COMPLICATIONS: ICD-10-CM

## 2017-07-31 DIAGNOSIS — Z11.4 ENCOUNTER FOR HIV (HUMAN IMMUNODEFICIENCY VIRUS) TEST: ICD-10-CM

## 2017-07-31 DIAGNOSIS — M25.551 RIGHT HIP PAIN: Primary | ICD-10-CM

## 2017-07-31 LAB — HIV1+2 IGG SERPL QL IA.RAPID: NEGATIVE

## 2017-07-31 PROCEDURE — 86703 HIV-1/HIV-2 1 RESULT ANTBDY: CPT

## 2017-07-31 PROCEDURE — 25000003 PHARM REV CODE 250

## 2017-07-31 PROCEDURE — 99284 EMERGENCY DEPT VISIT MOD MDM: CPT | Mod: ,,, | Performed by: EMERGENCY MEDICINE

## 2017-07-31 PROCEDURE — 99284 EMERGENCY DEPT VISIT MOD MDM: CPT

## 2017-07-31 RX ORDER — NAPROXEN 500 MG/1
500 TABLET ORAL
Status: COMPLETED | OUTPATIENT
Start: 2017-07-31 | End: 2017-07-31

## 2017-07-31 RX ADMIN — NAPROXEN 500 MG: 500 TABLET ORAL at 09:07

## 2017-07-31 NOTE — ED PROVIDER NOTES
"Encounter Date: 7/31/2017       History     Chief Complaint   Patient presents with    Post-op Problem     had surg 3 weeks ago here think my abdomen staples are infected and my r hip hurting     52-year-old female with medical history of hypertension, hyperlipidemia, diabetes mellitus, s/p ex lap  secondary to bowel obstruction presents the ED with multiple complaints.  Patient is requesting HIV test.  Patient also reports nontraumatic right hip pain and concern for surgical incision. States the incision is "very itchy". Patient reports putting neosporin on incision.  Patient's ex lap on 7/10, discharged on 7/14 and told to follow up with Dr. Pendleton on 8/8. Patient denies fever, chills, n/v, diarrhea, constipation, abdominal pain, drainage, warmth, redness of incision site, weakness, syncope, changes in urination.            Review of patient's allergies indicates:   Allergen Reactions    Penicillins Hives     Past Medical History:   Diagnosis Date    Cancer     Diabetes mellitus     High cholesterol     Hypertension     Stroke with cerebral ischemia     Stroke, thrombotic      Past Surgical History:   Procedure Laterality Date    ABDOMINAL SURGERY       Family History   Problem Relation Age of Onset    Hypertension Father     Diabetes Maternal Aunt      Social History   Substance Use Topics    Smoking status: Current Every Day Smoker     Packs/day: 0.50     Types: Cigarettes    Smokeless tobacco: Never Used    Alcohol use Yes     Review of Systems   Constitutional: Negative for chills, diaphoresis and fever.        Requesting HIV test   HENT: Negative for congestion and sore throat.    Respiratory: Negative for shortness of breath.    Cardiovascular: Negative for chest pain.   Gastrointestinal: Negative for abdominal distention, abdominal pain, blood in stool, constipation, diarrhea, nausea and vomiting.   Genitourinary: Negative for dysuria.   Musculoskeletal: Positive for arthralgias. Negative for " back pain and myalgias.   Skin: Positive for wound. Negative for rash.   Neurological: Negative for syncope, weakness, light-headedness and headaches.   Hematological: Does not bruise/bleed easily.   Psychiatric/Behavioral: The patient is not nervous/anxious.        Physical Exam     Initial Vitals [07/31/17 0810]   BP Pulse Resp Temp SpO2   (!) 170/77 77 18 99 °F (37.2 °C) 99 %      MAP       108         Physical Exam    Vitals reviewed.  Constitutional: Vital signs are normal. She appears well-developed and well-nourished. She is not diaphoretic. No distress.   HENT:   Head: Normocephalic and atraumatic.   Nose: Nose normal.   Mouth/Throat: Oropharynx is clear and moist.   Eyes: Conjunctivae, EOM and lids are normal. Pupils are equal, round, and reactive to light. Lids are everted and swept, no foreign bodies found.   Neck: Trachea normal and normal range of motion. Neck supple.   Cardiovascular: Normal rate, regular rhythm and normal pulses.   No murmur heard.  Pulmonary/Chest: Breath sounds normal. She has no wheezes. She has no rhonchi. She has no rales. She exhibits no tenderness.   Abdominal: Soft. Normal appearance and bowel sounds are normal. She exhibits no distension. There is no tenderness. There is no rebound.   Surgical staples in place. No drainage, erythema or warmth noted. Incision is healing well.   Musculoskeletal: She exhibits no edema.   Neurological: She is alert and oriented to person, place, and time. She has normal strength. No sensory deficit.   Skin: Skin is warm. Capillary refill takes less than 2 seconds. No cyanosis.   Psychiatric: She has a normal mood and affect.         ED Course   Procedures  Labs Reviewed   RAPID HIV        Imaging Results          X-Ray Hip 2 View Right (Final result)  Result time 07/31/17 10:06:38    Final result by Zach Gabriel MD (07/31/17 10:06:38)                 Impression:        No evidence of fracture.        Electronically signed by: ZACH  "BRETT GARCIAS  Date:     07/31/17  Time:    10:06              Narrative:    [id number]  Accession # [id number]      Study:  XR HIP 2 VIEW RIGHT    Indication: right hip pain.    Comparison: None.    Findings:     Right hip 2 views.    No evidence of fracture or dislocation.  Mild degenerative changes of the hips bilaterally.  Joint spaces are satisfactorily maintained.  Post surgical changes of the mid pelvis.  Vascular calcifications noted.  No radiopaque foreign body.                                 Medical Decision Making:   History:   Old Medical Records: I decided to obtain old medical records.  Old Records Summarized: records from clinic visits.  Clinical Tests:   Lab Tests: Ordered and Reviewed  Radiological Study: Ordered and Reviewed       APC / Resident Notes:   52-year-old female with medical history of hypertension, hyperlipidemia, diabetes mellitus, s/p ex lap  secondary to bowel obstruction presents to the ED with multiple complaints.cardiac exam reveals regular rate and rhythm.  Lungs clear bilaterally on auscultation with no decreased breath sounds.  Abdomen is soft nontender, nondistended with normal bowel sounds ×4, surgical staples in place. No drainage, erythema or warmth noted. Dry, flaky skin noted to incision. Strength intact.  Sensation intact.  Distal pulses intact.  Vital stable.  Patient is in no acute distress.    Informed patient to stop putting neosporin on surgical site and follow up with Dr. Pendleton on 8/8.    Patient given oral naproxen 500mg.     Labs and imaging reviewed.    Rapid HIV negative.    Right hip xray shows, "No evidence of fracture."    DDX includes is not limited to HIV, arthritis, fracture, IT band tendonitis, cellulitis, post op problem.     Discharged to home in stable condition, return to ED warnings given, follow up and patient care instructions given.    I have discussed and reviewed with my supervising physician.         Attending Attestation:     Physician Attestation " Statement for NP/PA:   I have conducted a face to face encounter with this patient in addition to the NP/PA, due to Medical Complexity    Other NP/PA Attestation Additions:    History of Present Illness: Post-op pruritis over midline abd wound, has been using neosporin repeatedly to area and has clinical signs of mild contact dermatitis.  No evidence wound infection and no abd pain/tenderness on exam.  Advised to d/c neosporin. Has f/u with general surgery next week for staple removal                   ED Course     Clinical Impression:   The primary encounter diagnosis was Right hip pain. Diagnoses of No post-op complications and Encounter for HIV (human immunodeficiency virus) test were also pertinent to this visit.    Disposition:   Disposition: Discharged  Condition: Stable                        Makayla Weaver PA-C  07/31/17 1210

## 2017-07-31 NOTE — ED TRIAGE NOTES
Pain to right hip and RLE  since yesterday    GENERAL: The patient is a well-developed, well-nourished female in no apparent distress. She is alert and oriented x3.    HEENT: Head is normocephalic and atraumatic. Extraocular muscles are intact. Pupils are equal, round, and reactive to light and accommodation. Nares appeared normal. Mouth is well hydrated and without lesions. Mucous membranes are moist. Posterior pharynx clear of any exudate or lesions.    NECK: Supple. No carotid bruits. No lymphadenopathy or thyromegaly.    LUNGS: Clear to auscultation.    HEART: Regular rate and rhythm without murmur.     ABDOMEN: Soft, nontender, and nondistended. Positive bowel sounds. No hepatosplenomegaly was noted.     EXTREMITIES: Without any cyanosis, clubbing, rash, lesions or edema.     NEUROLOGIC: Cranial nerves II through XII are grossly intact.     PSYCHIATRIC: Flat affect, but denies suicidal or homicidal ideations.    SKIN: No ulceration or induration present.

## 2017-07-31 NOTE — ED NOTES
Pt ambulated from RWR to consult room 2 to receive medication, then back to RWR with no assistance.

## 2017-08-08 ENCOUNTER — OFFICE VISIT (OUTPATIENT)
Dept: SURGERY | Facility: CLINIC | Age: 52
End: 2017-08-08
Payer: MEDICAID

## 2017-08-08 VITALS
HEART RATE: 63 BPM | BODY MASS INDEX: 25.16 KG/M2 | DIASTOLIC BLOOD PRESSURE: 68 MMHG | HEIGHT: 65 IN | WEIGHT: 151 LBS | TEMPERATURE: 98 F | SYSTOLIC BLOOD PRESSURE: 161 MMHG

## 2017-08-08 DIAGNOSIS — K56.609 SMALL BOWEL OBSTRUCTION: Primary | ICD-10-CM

## 2017-08-08 PROCEDURE — 99024 POSTOP FOLLOW-UP VISIT: CPT | Mod: ,,, | Performed by: SURGERY

## 2017-08-08 PROCEDURE — 99213 OFFICE O/P EST LOW 20 MIN: CPT | Mod: PBBFAC | Performed by: SURGERY

## 2017-08-08 PROCEDURE — 99999 PR PBB SHADOW E&M-EST. PATIENT-LVL III: CPT | Mod: PBBFAC,,, | Performed by: SURGERY

## 2017-08-08 RX ORDER — OXYCODONE AND ACETAMINOPHEN 5; 325 MG/1; MG/1
1 TABLET ORAL EVERY 4 HOURS PRN
Qty: 21 TABLET | Refills: 0 | Status: SHIPPED | OUTPATIENT
Start: 2017-08-08

## 2018-09-11 ENCOUNTER — HOSPITAL ENCOUNTER (EMERGENCY)
Facility: HOSPITAL | Age: 53
Discharge: HOME | End: 2018-09-11
Attending: EMERGENCY MEDICINE
Payer: MEDICAID

## 2018-09-11 VITALS
OXYGEN SATURATION: 99 % | TEMPERATURE: 98 F | DIASTOLIC BLOOD PRESSURE: 78 MMHG | WEIGHT: 162 LBS | BODY MASS INDEX: 26.96 KG/M2 | HEART RATE: 67 BPM | RESPIRATION RATE: 18 BRPM | SYSTOLIC BLOOD PRESSURE: 113 MMHG

## 2018-09-11 DIAGNOSIS — M25.551 HIP PAIN, CHRONIC, RIGHT: Primary | ICD-10-CM

## 2018-09-11 DIAGNOSIS — G89.29 HIP PAIN, CHRONIC, RIGHT: Primary | ICD-10-CM

## 2018-09-11 PROCEDURE — 63600175 PHARM REV CODE 636 W HCPCS: Performed by: EMERGENCY MEDICINE

## 2018-09-11 PROCEDURE — 99283 EMERGENCY DEPT VISIT LOW MDM: CPT | Mod: 25

## 2018-09-11 PROCEDURE — 96372 THER/PROPH/DIAG INJ SC/IM: CPT

## 2018-09-11 PROCEDURE — 99284 EMERGENCY DEPT VISIT MOD MDM: CPT | Mod: ,,, | Performed by: EMERGENCY MEDICINE

## 2018-09-11 RX ORDER — MORPHINE SULFATE 2 MG/ML
6 INJECTION, SOLUTION INTRAMUSCULAR; INTRAVENOUS
Status: COMPLETED | OUTPATIENT
Start: 2018-09-11 | End: 2018-09-11

## 2018-09-11 RX ADMIN — Medication 6 MG: at 07:09

## 2018-09-11 NOTE — ED NOTES
Patient identifiers verified and correct for Ms Chavez  C/C: Pain right hip radiating down leg  APPEARANCE: awake and alert in NAD.  SKIN: warm, dry and intact. No breakdown or bruising.  MUSCULOSKELETAL: Patient moving all extremities spontaneously, no obvious swelling or deformities noted. Ambulates independently.  RESPIRATORY: Denies shortness of breath.Respirations unlabored.   CARDIAC: Denies CP, 2+ distal pulses; no peripheral edema  ABDOMEN: S/ND/NT, Denies nausea  : voids spontaneously, denies difficulty  Neurologic: AAO x 4; follows commands equal strength in all extremities; denies numbness/tingling. Denies dizziness Denies weakness, pain to right hip with no injury.

## 2018-09-11 NOTE — ED TRIAGE NOTES
"Patient states she is a cancer patient taking radiation for "endometriosis" Pain right hip to knee, requests pain shot. Last radiation treatment 2 months ago. Denies injury, aspirin for pain, out of Percocet x 1 month, out of all other meds including  B/P meds x 1 week.   "

## 2018-09-11 NOTE — ED PROVIDER NOTES
Encounter Date: 9/11/2018    SCRIBE #1 NOTE: I, Sophia Garcia, am scribing for, and in the presence of,  Dr. Bishop. I have scribed the entire note.       History     Chief Complaint   Patient presents with    Hip Pain     states she has hx endometrial CA and stopped radiation tx about a month ago; denies fall or injury     Patient presents to the ED with complaints of chronic right hip pain. Patient has been experiencing flare up of pain for the past 2 days. Patient states pain radiates down to her thigh. Patient has hx of endometrial cancer and stopped radiation treatment approximately one month ago. Patient states she had her procedure done at [facility] by Dr. Hernandez, whom she has not been in contact with for current sx. Patient is requesting something for pain until she can see her physician at [facility]. Patient denies recent trauma to area.       The history is provided by the patient.     Review of patient's allergies indicates:   Allergen Reactions    Penicillins Hives     Past Medical History:   Diagnosis Date    Cancer     Diabetes mellitus     High cholesterol     Hypertension     Stroke with cerebral ischemia     Stroke, thrombotic      Past Surgical History:   Procedure Laterality Date    ABDOMINAL SURGERY      EXPLORATORY-LAPAROTOMY for 10 am N/A 7/10/2017    Performed by Joshua Goldberg, MD at [facility] OR 2ND FLR    LYSIS-ADHESION  7/10/2017    Performed by Joshua Goldberg, MD at [facility] OR 2ND FLR     Family History   Problem Relation Age of Onset    Hypertension Father     Diabetes Maternal Aunt      Social History     Tobacco Use    Smoking status: Current Every Day Smoker     Packs/day: 0.50     Types: Cigarettes    Smokeless tobacco: Never Used   Substance Use Topics    Alcohol use: No     Frequency: Never    Drug use: No     Review of Systems   Constitutional: Negative for chills, diaphoresis and fever.   HENT: Negative for facial swelling, rhinorrhea, trouble swallowing and  voice change.    Eyes: Negative for visual disturbance.   Respiratory: Negative for cough, chest tightness and shortness of breath.    Cardiovascular: Negative for chest pain and palpitations.   Gastrointestinal: Negative for abdominal pain, diarrhea, nausea and vomiting.   Genitourinary: Negative for dysuria, flank pain and hematuria.   Musculoskeletal: Negative for back pain and gait problem.        Right hip and thigh pain   Neurological: Negative for weakness, light-headedness, numbness and headaches.   Psychiatric/Behavioral: Negative for behavioral problems and confusion.       Physical Exam     Initial Vitals [09/11/18 1631]   BP Pulse Resp Temp SpO2   111/65 61 20 98 °F (36.7 °C) 100 %      MAP       --         Physical Exam    Nursing note and vitals reviewed.    Appearance: No acute distress.  Skin: No rashes seen.  Good turgor.  No abrasions.  No ecchymoses.  Eyes: No conjunctival injection.  ENT: Oropharynx clear.    Chest: Clear to auscultation bilaterally.  Good air movement.  No wheezes.  No rhonchi.  Cardiovascular: Regular rate and rhythm.  No murmurs. No gallops. No rubs.  Abdomen: Soft.  Not distended.  Nontender.  No guarding.  No rebound.  Musculoskeletal: Tenderness to the left aspect of the right hip.Good range of motion all joints.  No deformities.  Neck supple.  No meningismus.  Neurologic: Motor intact.  Sensation intact.  Cerebellar intact.  Cranial nerves intact.  Mental Status:  Alert and oriented x 3.  Appropriate, conversant.      ED Course   Procedures  Labs Reviewed - No data to display       Imaging Results    None          Medical Decision Making:   History:   Old Medical Records: I decided to obtain old medical records.  Initial Assessment:   Evaluation of chronic right hip pain for years. Current flares for the past couple of days. Reviewed patient chart which shows an extensive number of visits for her chronic pain with request for pain shots and prescriptions over the past  several years. Irregular masses on previous CT suggests cancer diagnosis is likely accurate. Will give one dose of morphine for pain in the ED but informed patient she must get prescriptions from her primary treating physicians.              Scribe Attestation:   Scribe #1: I performed the above scribed service and the documentation accurately describes the services I performed. I attest to the accuracy of the note.               Clinical Impression:   The encounter diagnosis was Hip pain, chronic, right.                             Jose Maria Bishop MD  09/11/18 1951

## 2018-09-28 ENCOUNTER — TELEPHONE (OUTPATIENT)
Dept: ENDOSCOPY | Facility: HOSPITAL | Age: 53
End: 2018-09-28

## 2019-05-07 ENCOUNTER — HOSPITAL ENCOUNTER (EMERGENCY)
Facility: HOSPITAL | Age: 54
Discharge: HOME | End: 2019-05-07
Attending: EMERGENCY MEDICINE
Payer: MEDICAID

## 2019-05-07 VITALS
HEIGHT: 65 IN | HEART RATE: 86 BPM | SYSTOLIC BLOOD PRESSURE: 208 MMHG | BODY MASS INDEX: 29.49 KG/M2 | DIASTOLIC BLOOD PRESSURE: 90 MMHG | OXYGEN SATURATION: 98 % | WEIGHT: 177 LBS | RESPIRATION RATE: 18 BRPM | TEMPERATURE: 99 F

## 2019-05-07 DIAGNOSIS — K12.1 MOUTH ULCER: Primary | ICD-10-CM

## 2019-05-07 PROCEDURE — 99283 EMERGENCY DEPT VISIT LOW MDM: CPT

## 2019-05-07 PROCEDURE — 99284 EMERGENCY DEPT VISIT MOD MDM: CPT | Mod: ,,, | Performed by: PHYSICIAN ASSISTANT

## 2019-05-07 PROCEDURE — 25000003 PHARM REV CODE 250: Performed by: PHYSICIAN ASSISTANT

## 2019-05-07 PROCEDURE — 99284 PR EMERGENCY DEPT VISIT,LEVEL IV: ICD-10-PCS | Mod: ,,, | Performed by: PHYSICIAN ASSISTANT

## 2019-05-07 RX ORDER — ACETAMINOPHEN 325 MG/1
650 TABLET ORAL
Status: COMPLETED | OUTPATIENT
Start: 2019-05-07 | End: 2019-05-07

## 2019-05-07 RX ORDER — NAPROXEN 500 MG/1
500 TABLET ORAL
Status: COMPLETED | OUTPATIENT
Start: 2019-05-07 | End: 2019-05-07

## 2019-05-07 RX ORDER — NAPROXEN 500 MG/1
500 TABLET ORAL 2 TIMES DAILY WITH MEALS
Qty: 20 TABLET | Refills: 0 | Status: SHIPPED | OUTPATIENT
Start: 2019-05-07

## 2019-05-07 RX ADMIN — ACETAMINOPHEN 650 MG: 325 TABLET ORAL at 04:05

## 2019-05-07 RX ADMIN — NAPROXEN 500 MG: 500 TABLET ORAL at 04:05

## 2019-05-07 NOTE — ED PROVIDER NOTES
Encounter Date: 5/7/2019    SCRIBE #1 NOTE: I, Danica Wang, am scribing for, and in the presence of,  Jose Maria Bishop MD. I have scribed the following portions of the note - the APC attestation.       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 Patient states on Friday, 4 days ago, she noted pain to the bottom of her tongue.  Her pain has progressed which prompted her to come into the emergency department.  She is complaining of 10/10 pain to the bottom right of her tongue that is worse with eating.  She has tried chlorhexidine and peroxide without improvement in her symptoms.  She denies any previous history.  Her oncologist is at [facility] and she is receiving chemotherapy infusions; next appointment is next Monday.  She also notes right lower extremity pain to her R calf only with ambulation for the past 1-2 months.        Review of patient's allergies indicates:   Allergen Reactions    Penicillins Hives     Past Medical History:   Diagnosis Date    Cancer     Diabetes mellitus     High cholesterol     Hypertension     Stroke with cerebral ischemia     Stroke, thrombotic      Past Surgical History:   Procedure Laterality Date    ABDOMINAL SURGERY      EXPLORATORY-LAPAROTOMY for 10 am N/A 7/10/2017    Performed by [facility] at [facility] OR 2ND FLR    LYSIS-ADHESION  7/10/2017    Performed by Joshua Goldberg, MD at [facility] OR 2ND FLR     Family History   Problem Relation Age of Onset    Hypertension Father     Diabetes Maternal Aunt      Social History     Tobacco Use    Smoking status: Current Every Day Smoker     Packs/day: 0.50     Types: Cigarettes    Smokeless tobacco: Never Used   Substance Use Topics    Alcohol use: No      Frequency: Never    Drug use: No     Review of Systems   Constitutional: Negative for chills and fever.   HENT: Positive for mouth sores. Negative for dental problem, sinus pain and sore throat.    Eyes: Negative for visual disturbance.   Respiratory: Negative for cough and shortness of breath.    Cardiovascular: Negative for chest pain.   Gastrointestinal: Negative for nausea.   Endocrine: Negative for polyuria.   Genitourinary: Negative for dysuria and hematuria.   Musculoskeletal: Positive for myalgias (with ambulation). Negative for back pain.   Skin: Negative for rash.   Neurological: Negative for weakness and headaches.   Hematological: Does not bruise/bleed easily.       Physical Exam     Initial Vitals [05/07/19 1453]   BP Pulse Resp Temp SpO2   (!) 208/90 86 18 98.6 °F (37 °C) 98 %      MAP       --         Physical Exam    Vitals reviewed.  Constitutional: She appears well-developed and well-nourished. She is not diaphoretic. No distress.   HENT:   Head: Normocephalic and atraumatic.   Nose: Nose normal.   Mouth/Throat: Mucous membranes are not pale. Oral lesions present. Abnormal dentition. Dental caries present. No dental abscesses or lacerations. No oropharyngeal exudate, posterior oropharyngeal edema or posterior oropharyngeal erythema.   1 x 0.5 ulcer noted to bottom of R side of tongue with tenderness. No erythema. No plaques. Small abrasion noted to L tongue.    Eyes: Conjunctivae and EOM are normal.   Neck: Normal range of motion.   Cardiovascular: Normal rate.   Pulses:       Dorsalis pedis pulses are 2+ on the right side, and 2+ on the left side.   No calf tenderness or unilateral leg swelling.    Pulmonary/Chest: No respiratory distress.   Musculoskeletal: Normal range of motion.   Neurological: She is alert and oriented to person, place, and time. She has normal strength. No sensory deficit.   Skin: Skin is warm and dry. No erythema. No pallor.   Psychiatric: She has a normal mood and affect.  Thought content normal.         ED Course   Procedures  Labs Reviewed - No data to display       Imaging Results    None          Medical Decision Making:   History:   Old Medical Records: I decided to obtain old medical records.  Old Records Summarized: records from another hospital.  Initial Assessment:   Patient is a 53-year-old female with a history of endometrial cancer on chemo therapy infusions presents the ED with mild if ulcer for 4 days.  Differential Diagnosis:   Includes but is not limited to aphthous ulcer, cold sores, cancer.  Patient is right lower extremity neurovascularly intact and without signs of DVT.  History is consistent with claudications.  I doubt acute ischemic limb.    ED Management:  Given history and physical exam, patient's symptoms may be due to aphthous ulcer.  No signs of cold sore/fever blisters.  Does not appear like leukoplakia.  I will give patient naproxen here.  I will provide patient with Magic mouthwash for pain relief at home.  She is to follow up with her oncologist for further evaluation if her symptoms do not improve.  All questions were answered.  Patient comfortable with plan and stable for discharge.    I have reviewed patient's chart and discussed this case with my supervising MD.     Casandra Del Angel PA-C  Emergent Department  [facility]  Spectralink #[id number] or #[id number]              Scribe Attestation:   Scribe #1: I performed the above scribed service and the documentation accurately describes the services I performed. I attest to the accuracy of the note.    Attending Attestation:     Physician Attestation Statement for NP/PA:   I discussed this assessment and plan of this patient with the NP/PA, but I did not personally examine the patient. The face to face encounter was performed by the NP/PA.                     Clinical Impression:       ICD-10-CM ICD-9-CM   1. Mouth ulcer K12.1 528.9         Disposition:   Disposition: Discharged  Condition:  Home Del Angel PA-C  05/07/19 1630

## 2019-05-07 NOTE — DISCHARGE INSTRUCTIONS
Swish and spit Magic Mouth wash every 4 hours as needed for pain relief. Take naproxen for pain relief every 12 hours as needed for pain; you were given your first dose in the emergency room.     Call and follow up with your Oncologist for further evaluation and management.     No future appointments.    Our goal in the emergency department is to always give you outstanding care and exceptional service. You may receive a survey by mail or e-mail in the next week regarding your experience in our ED. We would greatly appreciate your completing and returning the survey. Your feedback provides us with a way to recognize our staff who give very good care and it helps us learn how to improve when your experience was below our aspiration of excellence.

## 2019-05-07 NOTE — ED TRIAGE NOTES
The patient came into the ED by bus today. She states that she has metastatic cancer and is receiving chemotherapy. Next dose on May 13th. She states that she started having a sore under her tongue 3 days ago and it has gotten much worse. She states that it hurts to eat, drink, swallow and talk. She states that it feels like she has an ear ache as well.  The patient is awake, alert, and oriented at this time. Pupils are LISA. No facial drooping and speech is clear. The lung sounds are clear. Respirations are even and unlabored. Cardiac rhythm is NSR with no extra heart sounds. Abdomen is soft and round with sounds of digestion in all quadrants. The patient denies any nausea, vomiting, diarrhea, or constipation. The patient is not complaining of any urinary problems at this time. Provider to see the patient. Will continue to monitor closely.

## 2020-09-21 ENCOUNTER — TELEPHONE (OUTPATIENT)
Dept: OBSTETRICS AND GYNECOLOGY | Facility: CLINIC | Age: 55
End: 2020-09-21

## 2020-09-21 NOTE — TELEPHONE ENCOUNTER
"Received call from [facility] about this patient w/ reported history of endometrial cancer under their care. RN informed me that patient is on my schedule for new patient visit tomorrow and that hospice would have to drop her from their care should she want to be seen, but that it is her prerogative.     I called the patient to clarify. She states she wants to leave [facility] and see a new gynecologist and oncologist. I told her I do not treat cancer and that we could facilitate appointment with Gyn Onc, however she wants to come in tomorrow for "other issues."  If she chooses to leave hospice/[facility] we can help her get established at [facility].  I strongly encouraged her to reach out to [facility] to address any concerns, and reiterated that she would be dropped from their care if she sought care elsewhere and that it may be difficult to get re-accepted. I also encouraged her to reach out to her previous Gyn Onc to address any issues with her care as they would be best suited to address any needs since they are familiar with her history.    "

## 2023-06-20 NOTE — ED NOTES
Physician at bedside.     Closure 2 Information: This tab is for additional flaps and grafts, including complex repair and grafts and complex repair and flaps. You can also specify a different location for the additional defect, if the location is the same you do not need to select a new one. We will insert the automated text for the repair you select below just as we do for solitary flaps and grafts. Please note that at this time if you select a location with a different insurance zone you will need to override the ICD10 and CPT if appropriate.

## (undated) DEVICE — ELECTRODE REM PLYHSV RETURN 9

## (undated) DEVICE — DRAPE INCISE IOBAN 2 23X17IN

## (undated) DEVICE — TOWEL OR XRAY WHITE 17X26IN

## (undated) DEVICE — SUT 1 48IN PDS II VIO MONO

## (undated) DEVICE — DRESSING ADH ISLAND 3.6 X 14

## (undated) DEVICE — DRAPE ABDOMINAL TIBURON 14X11

## (undated) DEVICE — SEE MEDLINE ITEM 156902

## (undated) DEVICE — BLADE 4 INCH EDGE UN-INS

## (undated) DEVICE — SEE MEDLINE ITEM 146417